# Patient Record
Sex: FEMALE | Race: WHITE | NOT HISPANIC OR LATINO | URBAN - METROPOLITAN AREA
[De-identification: names, ages, dates, MRNs, and addresses within clinical notes are randomized per-mention and may not be internally consistent; named-entity substitution may affect disease eponyms.]

---

## 2017-06-24 ENCOUNTER — EMERGENCY (EMERGENCY)
Facility: HOSPITAL | Age: 22
LOS: 1 days | Discharge: HOME | End: 2017-06-24
Attending: EMERGENCY MEDICINE
Payer: MEDICAID

## 2017-06-24 VITALS
HEART RATE: 106 BPM | RESPIRATION RATE: 18 BRPM | DIASTOLIC BLOOD PRESSURE: 87 MMHG | TEMPERATURE: 98 F | OXYGEN SATURATION: 100 % | WEIGHT: 139.99 LBS | HEIGHT: 68 IN | SYSTOLIC BLOOD PRESSURE: 119 MMHG
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
## 2017-06-24 DIAGNOSIS — M54.2 CERVICALGIA: ICD-10-CM

## 2017-06-24 DIAGNOSIS — M54.9 DORSALGIA, UNSPECIFIED: ICD-10-CM

## 2017-06-24 PROCEDURE — 99284 EMERGENCY DEPT VISIT MOD MDM: CPT

## 2017-06-24 PROCEDURE — 99283 EMERGENCY DEPT VISIT LOW MDM: CPT

## 2017-06-24 RX ORDER — IBUPROFEN 200 MG
600 TABLET ORAL ONCE
Qty: 0 | Refills: 0 | Status: COMPLETED | OUTPATIENT
Start: 2017-06-24 | End: 2017-06-24

## 2017-06-24 RX ADMIN — Medication 600 MILLIGRAM(S): at 15:22

## 2017-06-24 NOTE — ED PROVIDER NOTE - PHYSICAL EXAMINATION
neck cleared by nexus criteria  paraspinal ttp cervical spine  ttp over left upper ext FROM no deformity, neuro vasc intact

## 2017-06-24 NOTE — ED PROVIDER NOTE - CHPI ED SYMPTOMS NEG
no sleeping issues/no loss of consciousness/no fussiness/no difficulty bearing weight/no laceration/no dizziness/no disorientation/no headache/no bruising/no decreased eating/drinking

## 2019-01-29 ENCOUNTER — EMERGENCY (EMERGENCY)
Facility: HOSPITAL | Age: 24
LOS: 1 days | Discharge: HOME | End: 2019-01-29
Attending: EMERGENCY MEDICINE
Payer: COMMERCIAL

## 2019-01-29 VITALS
DIASTOLIC BLOOD PRESSURE: 68 MMHG | HEIGHT: 67 IN | TEMPERATURE: 98 F | HEART RATE: 80 BPM | OXYGEN SATURATION: 98 % | SYSTOLIC BLOOD PRESSURE: 103 MMHG | RESPIRATION RATE: 16 BRPM | WEIGHT: 154.98 LBS

## 2019-01-29 PROCEDURE — 99283 EMERGENCY DEPT VISIT LOW MDM: CPT | Mod: 25

## 2019-01-30 LAB
FLU A RESULT: SIGNIFICANT CHANGE UP
FLU A RESULT: SIGNIFICANT CHANGE UP
FLUAV AG NPH QL: SIGNIFICANT CHANGE UP
FLUBV AG NPH QL: SIGNIFICANT CHANGE UP
RSV RESULT: SIGNIFICANT CHANGE UP
RSV RNA RESP QL NAA+PROBE: SIGNIFICANT CHANGE UP

## 2019-01-30 PROCEDURE — 87631 RESP VIRUS 3-5 TARGETS: CPT

## 2019-01-30 PROCEDURE — 99283 EMERGENCY DEPT VISIT LOW MDM: CPT

## 2019-01-30 PROCEDURE — 87081 CULTURE SCREEN ONLY: CPT

## 2019-01-30 RX ORDER — AMOXICILLIN 250 MG/5ML
1 SUSPENSION, RECONSTITUTED, ORAL (ML) ORAL
Qty: 20 | Refills: 0
Start: 2019-01-30 | End: 2019-02-08

## 2019-01-30 NOTE — ED PROVIDER NOTE - NSFOLLOWUPINSTRUCTIONS_ED_ALL_ED_FT
Take antibiotic as prescribed.   Continue tylenol 650mg every 6 hours as needed for pain.  Followup with PMD for reevaluation.

## 2019-01-30 NOTE — ED ADULT NURSE NOTE - NSIMPLEMENTINTERV_GEN_ALL_ED
Implemented All Universal Safety Interventions:  [place] to call system. Call bell, personal items and telephone within reach. Instruct patient to call for assistance. Room bathroom lighting operational. Non-slip footwear when patient is off stretcher. Physically safe environment: no spills, clutter or unnecessary equipment. Stretcher in lowest position, wheels locked, appropriate side rails in place.

## 2019-01-30 NOTE — ED ADULT NURSE REASSESSMENT NOTE - NS ED NURSE REASSESS COMMENT FT1
The patient is adamant about not giving blood despite explanations with risks and benefits.  Dr. Judd made aware.

## 2019-01-30 NOTE — ED PROVIDER NOTE - OBJECTIVE STATEMENT
24yo F presents 1 day of sore throat. reports she was seen by OB who sent her to ED for flu testing. Patient currently 15wks gestation.  denies fevers, minimal cough-nonproductive.

## 2019-01-30 NOTE — ED PROVIDER NOTE - MEDICAL DECISION MAKING DETAILS
24yo F presents 1 day of sore throat. Exam shows pharyngitis. Flu swab negative. throat culture sent. Will treat empirically with amox.  Patient stable for discharge.

## 2019-01-30 NOTE — ED PROVIDER NOTE - ENMT, MLM
Airway patent, Nasal mucosa clear. Mouth with normal mucosa. Throat has mild erythema, no vesicles, no oropharyngeal exudates and uvula is midline. No cervical lymphadenopathy.

## 2019-02-01 LAB
CULTURE RESULTS: SIGNIFICANT CHANGE UP
SPECIMEN SOURCE: SIGNIFICANT CHANGE UP

## 2019-04-03 ENCOUNTER — OUTPATIENT (OUTPATIENT)
Dept: INPATIENT UNIT | Facility: HOSPITAL | Age: 24
LOS: 1 days | Discharge: HOME | End: 2019-04-03

## 2019-04-03 VITALS
HEART RATE: 89 BPM | RESPIRATION RATE: 16 BRPM | DIASTOLIC BLOOD PRESSURE: 63 MMHG | TEMPERATURE: 98 F | OXYGEN SATURATION: 100 % | SYSTOLIC BLOOD PRESSURE: 108 MMHG

## 2019-04-03 VITALS — SYSTOLIC BLOOD PRESSURE: 94 MMHG | DIASTOLIC BLOOD PRESSURE: 55 MMHG | HEART RATE: 77 BPM

## 2019-04-03 DIAGNOSIS — Z3A.00 WEEKS OF GESTATION OF PREGNANCY NOT SPECIFIED: ICD-10-CM

## 2019-04-03 DIAGNOSIS — O26.899 OTHER SPECIFIED PREGNANCY RELATED CONDITIONS, UNSPECIFIED TRIMESTER: ICD-10-CM

## 2019-04-03 LAB
BASOPHILS # BLD AUTO: 0.02 K/UL — SIGNIFICANT CHANGE UP (ref 0–0.2)
BASOPHILS NFR BLD AUTO: 0.2 % — SIGNIFICANT CHANGE UP (ref 0–2)
BLD GP AB SCN SERPL QL: NEGATIVE — SIGNIFICANT CHANGE UP
EOSINOPHIL # BLD AUTO: 0.09 K/UL — SIGNIFICANT CHANGE UP (ref 0–0.5)
EOSINOPHIL NFR BLD AUTO: 0.8 % — SIGNIFICANT CHANGE UP (ref 0–6)
FIBRINOGEN PPP-MCNC: 549 MG/DL — HIGH (ref 350–510)
HCT VFR BLD CALC: 30.8 % — LOW (ref 34.5–45)
HGB BLD-MCNC: 10 G/DL — LOW (ref 11.5–15.5)
IMM GRANULOCYTES NFR BLD AUTO: 0.9 % — SIGNIFICANT CHANGE UP (ref 0–1.5)
LYMPHOCYTES # BLD AUTO: 1.8 K/UL — SIGNIFICANT CHANGE UP (ref 1–3.3)
LYMPHOCYTES # BLD AUTO: 15.9 % — SIGNIFICANT CHANGE UP (ref 13–44)
MCHC RBC-ENTMCNC: 27.8 PG — SIGNIFICANT CHANGE UP (ref 27–34)
MCHC RBC-ENTMCNC: 32.5 % — SIGNIFICANT CHANGE UP (ref 32–36)
MCV RBC AUTO: 85.6 FL — SIGNIFICANT CHANGE UP (ref 80–100)
MONOCYTES # BLD AUTO: 0.8 K/UL — SIGNIFICANT CHANGE UP (ref 0–0.9)
MONOCYTES NFR BLD AUTO: 7.1 % — SIGNIFICANT CHANGE UP (ref 2–14)
NEUTROPHILS # BLD AUTO: 8.51 K/UL — HIGH (ref 1.8–7.4)
NEUTROPHILS NFR BLD AUTO: 75.1 % — SIGNIFICANT CHANGE UP (ref 43–77)
NRBC # FLD: 0 K/UL — SIGNIFICANT CHANGE UP (ref 0–0)
PLATELET # BLD AUTO: 222 K/UL — SIGNIFICANT CHANGE UP (ref 150–400)
PMV BLD: 10.2 FL — SIGNIFICANT CHANGE UP (ref 7–13)
RBC # BLD: 3.6 M/UL — LOW (ref 3.8–5.2)
RBC # FLD: 13.9 % — SIGNIFICANT CHANGE UP (ref 10.3–14.5)
RH IG SCN BLD-IMP: POSITIVE — SIGNIFICANT CHANGE UP
WBC # BLD: 11.32 K/UL — HIGH (ref 3.8–10.5)
WBC # FLD AUTO: 11.32 K/UL — HIGH (ref 3.8–10.5)

## 2019-04-03 NOTE — OB PROVIDER TRIAGE NOTE - NSHPPHYSICALEXAM_GEN_ALL_CORE
Assessment reveals VSS, abdomen soft, NT, gravid.   BPP 8/8, MVP 5.6, EFW 738g, vtx, fundal placenta appears intact.     Type and screen  Fibrinogen and CBC sent    For 4 hour monitoring.

## 2019-04-03 NOTE — OB PROVIDER TRIAGE NOTE - HISTORY OF PRESENT ILLNESS
22yo  @ 24.0 presents for evaluation after falling at 1430. Reports falling on her hands and knees and rolling onto her back. Denies abdominal trauma.   Denies LOF, VB, ctx and reports GFM.     Denies PMH/PSH    Ap course uncomplicated thus far

## 2019-04-03 NOTE — OB RN TRIAGE NOTE - CHIEF COMPLAINT QUOTE
I fell at 2:30pm but didn't hit my belly, landed on my hands and knees. My doctor said I should come get checked.

## 2019-04-23 ENCOUNTER — APPOINTMENT (OUTPATIENT)
Dept: RADIOLOGY | Facility: IMAGING CENTER | Age: 24
End: 2019-04-23
Payer: MEDICAID

## 2019-04-23 ENCOUNTER — OUTPATIENT (OUTPATIENT)
Dept: OUTPATIENT SERVICES | Facility: HOSPITAL | Age: 24
LOS: 1 days | End: 2019-04-23
Payer: COMMERCIAL

## 2019-04-23 DIAGNOSIS — Z34.02 ENCOUNTER FOR SUPERVISION OF NORMAL FIRST PREGNANCY, SECOND TRIMESTER: ICD-10-CM

## 2019-04-23 PROCEDURE — 71045 X-RAY EXAM CHEST 1 VIEW: CPT | Mod: 26

## 2019-04-23 PROCEDURE — 71045 X-RAY EXAM CHEST 1 VIEW: CPT

## 2019-06-03 ENCOUNTER — OUTPATIENT (OUTPATIENT)
Dept: OUTPATIENT SERVICES | Age: 24
LOS: 1 days | Discharge: HOME | End: 2019-06-03

## 2019-06-03 ENCOUNTER — APPOINTMENT (OUTPATIENT)
Dept: PEDIATRIC CARDIOLOGY | Facility: CLINIC | Age: 24
End: 2019-06-03
Payer: MEDICAID

## 2019-06-03 PROCEDURE — 76825 ECHO EXAM OF FETAL HEART: CPT

## 2019-06-03 PROCEDURE — 76827 ECHO EXAM OF FETAL HEART: CPT

## 2019-06-03 PROCEDURE — 99201 OFFICE OUTPATIENT NEW 10 MINUTES: CPT | Mod: 25

## 2019-06-03 PROCEDURE — 93325 DOPPLER ECHO COLOR FLOW MAPG: CPT

## 2019-07-03 ENCOUNTER — OUTPATIENT (OUTPATIENT)
Dept: OUTPATIENT SERVICES | Facility: HOSPITAL | Age: 24
LOS: 1 days | End: 2019-07-03

## 2019-07-03 ENCOUNTER — APPOINTMENT (OUTPATIENT)
Dept: ANTEPARTUM | Facility: HOSPITAL | Age: 24
End: 2019-07-03
Payer: MEDICAID

## 2019-07-03 ENCOUNTER — ASOB RESULT (OUTPATIENT)
Age: 24
End: 2019-07-03

## 2019-07-03 ENCOUNTER — APPOINTMENT (OUTPATIENT)
Dept: ANTEPARTUM | Facility: CLINIC | Age: 24
End: 2019-07-03
Payer: MEDICAID

## 2019-07-03 PROCEDURE — 76818 FETAL BIOPHYS PROFILE W/NST: CPT | Mod: 26

## 2019-07-03 PROCEDURE — 59025 FETAL NON-STRESS TEST: CPT | Mod: 26,59

## 2019-07-03 PROCEDURE — 76811 OB US DETAILED SNGL FETUS: CPT

## 2019-07-03 PROCEDURE — 59412 ANTEPARTUM MANIPULATION: CPT

## 2019-07-04 ENCOUNTER — OUTPATIENT (OUTPATIENT)
Dept: INPATIENT UNIT | Facility: HOSPITAL | Age: 24
LOS: 1 days | Discharge: HOME | End: 2019-07-04
Payer: MEDICAID

## 2019-07-04 VITALS — TEMPERATURE: 98 F

## 2019-07-04 VITALS
DIASTOLIC BLOOD PRESSURE: 63 MMHG | SYSTOLIC BLOOD PRESSURE: 105 MMHG | TEMPERATURE: 98 F | HEART RATE: 84 BPM | RESPIRATION RATE: 16 BRPM

## 2019-07-04 DIAGNOSIS — O26.899 OTHER SPECIFIED PREGNANCY RELATED CONDITIONS, UNSPECIFIED TRIMESTER: ICD-10-CM

## 2019-07-04 DIAGNOSIS — Z3A.00 WEEKS OF GESTATION OF PREGNANCY NOT SPECIFIED: ICD-10-CM

## 2019-07-04 PROCEDURE — 99212 OFFICE O/P EST SF 10 MIN: CPT | Mod: 25

## 2019-07-04 PROCEDURE — 59025 FETAL NON-STRESS TEST: CPT | Mod: 26

## 2019-07-04 PROCEDURE — 76815 OB US LIMITED FETUS(S): CPT | Mod: 26

## 2019-07-04 NOTE — OB PROVIDER TRIAGE NOTE - NSHPPHYSICALEXAM_GEN_ALL_CORE
Assessment reveals VSS, abdomen soft, NT, gravid.   BPP 8/8, MEAGHAN 10.6, vertex  Cat 1 FHT, Assessment reveals VSS, abdomen soft, NT, gravid.   BPP 8/8, MEAGHAN 10.6, vertex  Cat 1 FHT, irritability on toco

## 2019-07-04 NOTE — OB PROVIDER TRIAGE NOTE - HISTORY OF PRESENT ILLNESS
23yp  @ 37.1 presents for evaluation after successful ECV yesterday. Denies LOF, VB, ctx and reports GFM.     Denies PMH/PSH    AP course complicated by breech presentation 23yp  @ 37.1 presents for evaluation after successful ECV yesterday. Denies LOF, VB, ctx and reports GFM.     Denies PMH/PSH    AP course complicated by breech presentation  Fetal Alert: Frequent premature atrial contractions and blocked premature atrial contractions.  Frequent blocked APC's leading to variable fetal heart rates.

## 2019-07-09 ENCOUNTER — OUTPATIENT (OUTPATIENT)
Dept: INPATIENT UNIT | Facility: HOSPITAL | Age: 24
LOS: 1 days | Discharge: HOME | End: 2019-07-09
Payer: MEDICAID

## 2019-07-09 VITALS
TEMPERATURE: 99 F | HEART RATE: 80 BPM | SYSTOLIC BLOOD PRESSURE: 99 MMHG | DIASTOLIC BLOOD PRESSURE: 55 MMHG | RESPIRATION RATE: 16 BRPM

## 2019-07-09 VITALS — DIASTOLIC BLOOD PRESSURE: 54 MMHG | HEART RATE: 68 BPM | SYSTOLIC BLOOD PRESSURE: 97 MMHG

## 2019-07-09 DIAGNOSIS — O26.899 OTHER SPECIFIED PREGNANCY RELATED CONDITIONS, UNSPECIFIED TRIMESTER: ICD-10-CM

## 2019-07-09 DIAGNOSIS — Z3A.00 WEEKS OF GESTATION OF PREGNANCY NOT SPECIFIED: ICD-10-CM

## 2019-07-09 PROCEDURE — 59025 FETAL NON-STRESS TEST: CPT | Mod: 26

## 2019-07-09 NOTE — OB PROVIDER TRIAGE NOTE - NSOBPROVIDERNOTE_OBGYN_ALL_OB_FT
23 yr ol d @ 37.6 wks, r/o ROM  No evidence of ROM  NO evidence of labor  VE: L/C/P 23 yr ol d @ 37.6 wks, r/o ROM  No evidence of ROM  NO evidence of labor  VE: L/C/P    @ 9:19pm  CAT 1 tracing   TOCOL ctx none  Sono: vertex, MEAGHAN 10, BPP ,   Discussed with Dr. hernandez

## 2019-07-09 NOTE — OB PROVIDER TRIAGE NOTE - NSHPPHYSICALEXAM_GEN_ALL_CORE
VS: 97/54  TOCO: ctx irritability  NST: in progress  SSE: large white discharge consistent with Yeast infection, no pooling, nitrazine negative, ferning negative  VE: L/C/P

## 2019-07-09 NOTE — OB PROVIDER TRIAGE NOTE - HISTORY OF PRESENT ILLNESS
23 yr ol d @ 37.6 wks, presents with LOF @ 8 am, Denies VB/Ctx. Reports positive fetal movement. GBS results pending  PNI: s/p successful ECV on 7/3/19

## 2019-07-12 DIAGNOSIS — Z3A.37 37 WEEKS GESTATION OF PREGNANCY: ICD-10-CM

## 2019-07-17 ENCOUNTER — INPATIENT (INPATIENT)
Facility: HOSPITAL | Age: 24
LOS: 1 days | Discharge: HOME | End: 2019-07-19
Attending: OBSTETRICS & GYNECOLOGY | Admitting: OBSTETRICS & GYNECOLOGY

## 2019-07-17 VITALS — TEMPERATURE: 98 F | RESPIRATION RATE: 18 BRPM

## 2019-07-17 DIAGNOSIS — O26.899 OTHER SPECIFIED PREGNANCY RELATED CONDITIONS, UNSPECIFIED TRIMESTER: ICD-10-CM

## 2019-07-17 DIAGNOSIS — Z3A.00 WEEKS OF GESTATION OF PREGNANCY NOT SPECIFIED: ICD-10-CM

## 2019-07-17 PROBLEM — D64.9 ANEMIA, UNSPECIFIED: Chronic | Status: ACTIVE | Noted: 2019-07-09

## 2019-07-17 LAB
BASOPHILS # BLD AUTO: 0.03 K/UL — SIGNIFICANT CHANGE UP (ref 0–0.2)
BASOPHILS NFR BLD AUTO: 0.3 % — SIGNIFICANT CHANGE UP (ref 0–2)
BLD GP AB SCN SERPL QL: NEGATIVE — SIGNIFICANT CHANGE UP
EOSINOPHIL # BLD AUTO: 0.09 K/UL — SIGNIFICANT CHANGE UP (ref 0–0.5)
EOSINOPHIL NFR BLD AUTO: 1 % — SIGNIFICANT CHANGE UP (ref 0–6)
HCT VFR BLD CALC: 33.3 % — LOW (ref 34.5–45)
HGB BLD-MCNC: 10.5 G/DL — LOW (ref 11.5–15.5)
IMM GRANULOCYTES NFR BLD AUTO: 0.9 % — SIGNIFICANT CHANGE UP (ref 0–1.5)
LYMPHOCYTES # BLD AUTO: 1.52 K/UL — SIGNIFICANT CHANGE UP (ref 1–3.3)
LYMPHOCYTES # BLD AUTO: 16.7 % — SIGNIFICANT CHANGE UP (ref 13–44)
MCHC RBC-ENTMCNC: 26 PG — LOW (ref 27–34)
MCHC RBC-ENTMCNC: 31.5 % — LOW (ref 32–36)
MCV RBC AUTO: 82.4 FL — SIGNIFICANT CHANGE UP (ref 80–100)
MONOCYTES # BLD AUTO: 0.86 K/UL — SIGNIFICANT CHANGE UP (ref 0–0.9)
MONOCYTES NFR BLD AUTO: 9.5 % — SIGNIFICANT CHANGE UP (ref 2–14)
NEUTROPHILS # BLD AUTO: 6.5 K/UL — SIGNIFICANT CHANGE UP (ref 1.8–7.4)
NEUTROPHILS NFR BLD AUTO: 71.6 % — SIGNIFICANT CHANGE UP (ref 43–77)
NRBC # FLD: 0.02 K/UL — SIGNIFICANT CHANGE UP (ref 0–0)
PLATELET # BLD AUTO: 191 K/UL — SIGNIFICANT CHANGE UP (ref 150–400)
PMV BLD: 11 FL — SIGNIFICANT CHANGE UP (ref 7–13)
RBC # BLD: 4.04 M/UL — SIGNIFICANT CHANGE UP (ref 3.8–5.2)
RBC # FLD: 17.1 % — HIGH (ref 10.3–14.5)
RH IG SCN BLD-IMP: POSITIVE — SIGNIFICANT CHANGE UP
WBC # BLD: 9.08 K/UL — SIGNIFICANT CHANGE UP (ref 3.8–10.5)
WBC # FLD AUTO: 9.08 K/UL — SIGNIFICANT CHANGE UP (ref 3.8–10.5)

## 2019-07-17 RX ORDER — SODIUM CHLORIDE 9 MG/ML
1000 INJECTION, SOLUTION INTRAVENOUS
Refills: 0 | Status: DISCONTINUED | OUTPATIENT
Start: 2019-07-17 | End: 2019-07-17

## 2019-07-17 RX ORDER — LANOLIN
1 OINTMENT (GRAM) TOPICAL EVERY 6 HOURS
Refills: 0 | Status: DISCONTINUED | OUTPATIENT
Start: 2019-07-17 | End: 2019-07-19

## 2019-07-17 RX ORDER — TETANUS TOXOID, REDUCED DIPHTHERIA TOXOID AND ACELLULAR PERTUSSIS VACCINE, ADSORBED 5; 2.5; 8; 8; 2.5 [IU]/.5ML; [IU]/.5ML; UG/.5ML; UG/.5ML; UG/.5ML
0.5 SUSPENSION INTRAMUSCULAR ONCE
Refills: 0 | Status: DISCONTINUED | OUTPATIENT
Start: 2019-07-17 | End: 2019-07-19

## 2019-07-17 RX ORDER — BENZOCAINE 10 %
1 GEL (GRAM) MUCOUS MEMBRANE EVERY 6 HOURS
Refills: 0 | Status: DISCONTINUED | OUTPATIENT
Start: 2019-07-17 | End: 2019-07-19

## 2019-07-17 RX ORDER — AER TRAVELER 0.5 G/1
1 SOLUTION RECTAL; TOPICAL EVERY 4 HOURS
Refills: 0 | Status: DISCONTINUED | OUTPATIENT
Start: 2019-07-17 | End: 2019-07-19

## 2019-07-17 RX ORDER — OXYTOCIN 10 UNIT/ML
333.33 VIAL (ML) INJECTION
Qty: 20 | Refills: 0 | Status: COMPLETED | OUTPATIENT
Start: 2019-07-17 | End: 2019-07-17

## 2019-07-17 RX ORDER — KETOROLAC TROMETHAMINE 30 MG/ML
30 SYRINGE (ML) INJECTION ONCE
Refills: 0 | Status: DISCONTINUED | OUTPATIENT
Start: 2019-07-17 | End: 2019-07-17

## 2019-07-17 RX ORDER — DIBUCAINE 1 %
1 OINTMENT (GRAM) RECTAL EVERY 6 HOURS
Refills: 0 | Status: DISCONTINUED | OUTPATIENT
Start: 2019-07-17 | End: 2019-07-19

## 2019-07-17 RX ORDER — IBUPROFEN 200 MG
600 TABLET ORAL EVERY 6 HOURS
Refills: 0 | Status: DISCONTINUED | OUTPATIENT
Start: 2019-07-17 | End: 2019-07-19

## 2019-07-17 RX ORDER — HYDROCORTISONE 1 %
1 OINTMENT (GRAM) TOPICAL EVERY 6 HOURS
Refills: 0 | Status: DISCONTINUED | OUTPATIENT
Start: 2019-07-17 | End: 2019-07-19

## 2019-07-17 RX ORDER — OXYCODONE HYDROCHLORIDE 5 MG/1
5 TABLET ORAL
Refills: 0 | Status: DISCONTINUED | OUTPATIENT
Start: 2019-07-17 | End: 2019-07-19

## 2019-07-17 RX ORDER — OXYTOCIN 10 UNIT/ML
333.33 VIAL (ML) INJECTION
Qty: 20 | Refills: 0 | Status: DISCONTINUED | OUTPATIENT
Start: 2019-07-17 | End: 2019-07-18

## 2019-07-17 RX ORDER — GLYCERIN ADULT
1 SUPPOSITORY, RECTAL RECTAL AT BEDTIME
Refills: 0 | Status: DISCONTINUED | OUTPATIENT
Start: 2019-07-17 | End: 2019-07-19

## 2019-07-17 RX ORDER — OXYTOCIN 10 UNIT/ML
2 VIAL (ML) INJECTION
Qty: 30 | Refills: 0 | Status: DISCONTINUED | OUTPATIENT
Start: 2019-07-17 | End: 2019-07-17

## 2019-07-17 RX ORDER — OXYCODONE HYDROCHLORIDE 5 MG/1
5 TABLET ORAL ONCE
Refills: 0 | Status: DISCONTINUED | OUTPATIENT
Start: 2019-07-17 | End: 2019-07-19

## 2019-07-17 RX ORDER — SIMETHICONE 80 MG/1
80 TABLET, CHEWABLE ORAL EVERY 4 HOURS
Refills: 0 | Status: DISCONTINUED | OUTPATIENT
Start: 2019-07-17 | End: 2019-07-19

## 2019-07-17 RX ORDER — DIPHENHYDRAMINE HCL 50 MG
25 CAPSULE ORAL EVERY 6 HOURS
Refills: 0 | Status: DISCONTINUED | OUTPATIENT
Start: 2019-07-17 | End: 2019-07-19

## 2019-07-17 RX ORDER — MAGNESIUM HYDROXIDE 400 MG/1
30 TABLET, CHEWABLE ORAL
Refills: 0 | Status: DISCONTINUED | OUTPATIENT
Start: 2019-07-17 | End: 2019-07-19

## 2019-07-17 RX ORDER — DOCUSATE SODIUM 100 MG
100 CAPSULE ORAL
Refills: 0 | Status: DISCONTINUED | OUTPATIENT
Start: 2019-07-17 | End: 2019-07-19

## 2019-07-17 RX ORDER — PRAMOXINE HYDROCHLORIDE 150 MG/15G
1 AEROSOL, FOAM RECTAL EVERY 4 HOURS
Refills: 0 | Status: DISCONTINUED | OUTPATIENT
Start: 2019-07-17 | End: 2019-07-19

## 2019-07-17 RX ORDER — IBUPROFEN 200 MG
600 TABLET ORAL EVERY 6 HOURS
Refills: 0 | Status: COMPLETED | OUTPATIENT
Start: 2019-07-17 | End: 2020-06-14

## 2019-07-17 RX ORDER — ACETAMINOPHEN 500 MG
975 TABLET ORAL
Refills: 0 | Status: DISCONTINUED | OUTPATIENT
Start: 2019-07-17 | End: 2019-07-19

## 2019-07-17 RX ORDER — SODIUM CHLORIDE 9 MG/ML
3 INJECTION INTRAMUSCULAR; INTRAVENOUS; SUBCUTANEOUS EVERY 8 HOURS
Refills: 0 | Status: DISCONTINUED | OUTPATIENT
Start: 2019-07-17 | End: 2019-07-19

## 2019-07-17 RX ADMIN — Medication 1000 MILLIUNIT(S)/MIN: at 11:05

## 2019-07-17 RX ADMIN — SODIUM CHLORIDE 3 MILLILITER(S): 9 INJECTION INTRAMUSCULAR; INTRAVENOUS; SUBCUTANEOUS at 22:59

## 2019-07-17 RX ADMIN — Medication 975 MILLIGRAM(S): at 23:50

## 2019-07-17 RX ADMIN — SODIUM CHLORIDE 125 MILLILITER(S): 9 INJECTION, SOLUTION INTRAVENOUS at 04:56

## 2019-07-17 RX ADMIN — Medication 30 MILLIGRAM(S): at 11:50

## 2019-07-17 RX ADMIN — Medication 2 MILLIUNIT(S)/MIN: at 04:56

## 2019-07-17 RX ADMIN — Medication 600 MILLIGRAM(S): at 18:15

## 2019-07-17 RX ADMIN — Medication 1 TABLET(S): at 18:17

## 2019-07-17 RX ADMIN — Medication 600 MILLIGRAM(S): at 18:45

## 2019-07-17 RX ADMIN — Medication 975 MILLIGRAM(S): at 22:57

## 2019-07-17 NOTE — OB PROVIDER TRIAGE NOTE - NS_OBGYNHISTORY_OBGYN_ALL_OB_FT
GYN: Denies  OB: Denies      AP course uncomplicated  s/p Version on 7/3  GBS negative  Anemia in pregnancy on IRON

## 2019-07-17 NOTE — OB PROVIDER TRIAGE NOTE - NSHPPHYSICALEXAM_GEN_ALL_CORE
Assessment reveals VSS  Abdomen soft, NT, gravid   VE:4.5/70/-3  SSE: positive pooling, positive ferning, positive nitrazine

## 2019-07-17 NOTE — OB PROVIDER TRIAGE NOTE - NS_FETALANOMAL_OBGYN_ALL_OB
- Pain control  - Wound vac in place, suction on 125 continuous at all times  - abx     Dispo: to OR today for repeat I&D   Yes

## 2019-07-17 NOTE — OB PROVIDER TRIAGE NOTE - HISTORY OF PRESENT ILLNESS
22y/o G 24y/o  @39wks presents with leaking of fluid since 2am, clear.   Reports good fetal movement  Denies VB.     FETAL ALERT: Frequent premature atrial ctx, sees peds cards    Allergies:peaches-swelling  Medciations: PNV, iron  Denies Medical and Surgical HX  Denies Psy/Drugs/Etoh/Smoke

## 2019-07-17 NOTE — OB PROVIDER DELIVERY SUMMARY - NSPROVIDERDELIVERYNOTE_OBGYN_ALL_OB_FT
of a viable male infant from OA presentation. Head delivered easily, nuchal cord x 1 noted. Shoulders & body delivered. Infant handed to mother. Cord clamped x 2 & cut. Placenta delivered intact via gentle uterine massage. RML episiotomy repaired in the usual fashion. Excellent hemostasis noted. Fundus firm on bimanual massage.

## 2019-07-17 NOTE — OB PROVIDER H&P - NS_OBGYNHISTORY_OBGYN_ALL_OB_FT
GYN: Denies  OB: Denies      AP course uncomplicated  s/p Version on 7/3  GBS negative  Anemia in pregnancy on IRON  S/P fall x2 during pregnancy

## 2019-07-17 NOTE — OB PROVIDER H&P - HISTORY OF PRESENT ILLNESS
22y/o  @39wks presents with leaking of fluid since 2am, clear.   Reports good fetal movement  Denies VB.     FETAL ALERT: Frequent premature atrial ctx, sees peds cards    Allergies:peaches-swelling  Medciations: PNV, iron  Denies Medical and Surgical HX  Denies Psy/Drugs/Etoh/Smoke

## 2019-07-18 RX ORDER — ACETAMINOPHEN 500 MG
3 TABLET ORAL
Qty: 0 | Refills: 0 | DISCHARGE
Start: 2019-07-18

## 2019-07-18 RX ORDER — IBUPROFEN 200 MG
1 TABLET ORAL
Qty: 0 | Refills: 0 | DISCHARGE
Start: 2019-07-18

## 2019-07-18 RX ORDER — FERROUS SULFATE 325(65) MG
0 TABLET ORAL
Qty: 0 | Refills: 0 | DISCHARGE

## 2019-07-18 RX ADMIN — Medication 600 MILLIGRAM(S): at 07:48

## 2019-07-18 RX ADMIN — Medication 1 TABLET(S): at 14:03

## 2019-07-18 RX ADMIN — SODIUM CHLORIDE 3 MILLILITER(S): 9 INJECTION INTRAMUSCULAR; INTRAVENOUS; SUBCUTANEOUS at 06:29

## 2019-07-18 RX ADMIN — Medication 600 MILLIGRAM(S): at 14:03

## 2019-07-18 RX ADMIN — Medication 600 MILLIGRAM(S): at 14:30

## 2019-07-18 RX ADMIN — Medication 975 MILLIGRAM(S): at 06:25

## 2019-07-18 RX ADMIN — Medication 975 MILLIGRAM(S): at 05:26

## 2019-07-18 RX ADMIN — Medication 600 MILLIGRAM(S): at 08:46

## 2019-07-18 NOTE — PROGRESS NOTE ADULT - SUBJECTIVE AND OBJECTIVE BOX
Anesthesia Post-op Note    POD#1 S/P vaginal delivery    Patient is doing well.  OOBAA. Tolerating clears.  Pain is tolerable.  No residual anesthetic issues or complications noted.    Nikky Barron CRNA

## 2019-07-19 ENCOUNTER — TRANSCRIPTION ENCOUNTER (OUTPATIENT)
Age: 24
End: 2019-07-19

## 2019-07-19 VITALS
HEART RATE: 67 BPM | TEMPERATURE: 98 F | SYSTOLIC BLOOD PRESSURE: 102 MMHG | DIASTOLIC BLOOD PRESSURE: 61 MMHG | RESPIRATION RATE: 16 BRPM | OXYGEN SATURATION: 100 %

## 2019-07-19 LAB — T PALLIDUM AB TITR SER: NEGATIVE — SIGNIFICANT CHANGE UP

## 2019-07-19 RX ADMIN — Medication 600 MILLIGRAM(S): at 06:27

## 2019-07-19 RX ADMIN — Medication 600 MILLIGRAM(S): at 05:27

## 2019-07-19 NOTE — DISCHARGE NOTE OB - MEDICATION SUMMARY - MEDICATIONS TO TAKE
I will START or STAY ON the medications listed below when I get home from the hospital:    ibuprofen 600 mg oral tablet  -- 1 tab(s) by mouth every 6 hours, As Needed  -- Indication: For Labor and delivery, indication for care    acetaminophen 325 mg oral tablet  -- 3 tab(s) by mouth , As Needed  -- Indication: For Labor and delivery, indication for care

## 2019-07-19 NOTE — DISCHARGE NOTE OB - PATIENT PORTAL LINK FT
You can access the [facility] Patient Portal, offered by [facility], by registering with the following website: [email or web address]

## 2020-01-14 ENCOUNTER — EMERGENCY (EMERGENCY)
Facility: HOSPITAL | Age: 25
LOS: 1 days | Discharge: HOME | End: 2020-01-14
Attending: EMERGENCY MEDICINE
Payer: MEDICAID

## 2020-01-14 VITALS
HEIGHT: 67 IN | TEMPERATURE: 98 F | HEART RATE: 92 BPM | RESPIRATION RATE: 16 BRPM | DIASTOLIC BLOOD PRESSURE: 77 MMHG | WEIGHT: 145.06 LBS | OXYGEN SATURATION: 99 % | SYSTOLIC BLOOD PRESSURE: 117 MMHG

## 2020-01-14 PROCEDURE — 99283 EMERGENCY DEPT VISIT LOW MDM: CPT

## 2020-01-14 RX ORDER — IBUPROFEN 200 MG
600 TABLET ORAL ONCE
Refills: 0 | Status: DISCONTINUED | OUTPATIENT
Start: 2020-01-14 | End: 2020-01-14

## 2020-01-14 RX ORDER — KETOROLAC TROMETHAMINE 30 MG/ML
15 SYRINGE (ML) INJECTION ONCE
Refills: 0 | Status: DISCONTINUED | OUTPATIENT
Start: 2020-01-14 | End: 2020-01-14

## 2020-01-14 NOTE — ED ADULT TRIAGE NOTE - CHIEF COMPLAINT QUOTE
Missed step and fell about 2.5 feet (stairs),reports hitting head on the wall ,denied loc ,also c/o l knee pain too.

## 2020-01-14 NOTE — ED PROVIDER NOTE - OBJECTIVE STATEMENT
Pt is a 24y F with significant PMHx of anemia and no significant PSHx presenting to the ED s/p trip and fall. Pt reports she missed a step and fell down 2.5 feet and hit her head on a wall. Pt denies any loss of consciousness, nausea, confusion, or amnesia. Pt is not on blood thinners. Pt additionally complains of knee pain but notes no trouble bending her knee. Pt has NKDA and currently denies any additional complaints at this time.

## 2020-01-14 NOTE — ED PROVIDER NOTE - NS_EDPROVIDERDISPOUSERTYPE_ED_A_ED
Medical Social Work    Referral: Legal Hold    Intervention: Legal Hold Paperwork given to SW by Life Skills RN Maria Luisa    Legal Hold Initiated: Date: 8/24/18 Time: 0130    Patient’s Insurance Listed on Face Sheet: None    Referrals sent to: [facility]    This referral contains the following information:  1) Face sheet __x__  2) Page 1 and Page 2 of Legal Hold __x__  3) Alert Team Assessment/Psych Assessment __x__  4) Head to toe physical exam __x__  5) Urine Drug Screen __x__  6) Blood Alcohol __x__  7) Vital signs __x_  8) Pregnancy test when applicable __na_  9) Medications list __x__    Plan: Patient will transfer to mental health facility once acceptance is obtained     Attending Attestation (For Attendings USE Only)... Attending Attestation (For Attendings USE Only).../Scribe Attestation (For Scribes USE Only)...

## 2020-01-14 NOTE — ED PROVIDER NOTE - NS_ATTENDINGSCRIBE_ED_ALL_ED
Last OV:5/17/19 venous stasis CKD   Next OV:8/10/19     Requesting refill on below medications last filled.3/4/19 qty-60 2 refills     Per protocol I can fill     Done     
I personally performed the service described in the documentation recorded by the scribe in my presence, and it accurately and completely records my words and actions.

## 2020-01-14 NOTE — ED PROVIDER NOTE - PATIENT PORTAL LINK FT
You can access the FollowMyHealth Patient Portal offered by [facility] by registering at the following website: [email or web address]. By joining [facility]’s FollowMyHealth portal, you will also be able to view your health information using other applications (apps) compatible with our system.

## 2020-01-14 NOTE — ED ADULT NURSE NOTE - NSIMPLEMENTINTERV_GEN_ALL_ED
Implemented All Fall Risk Interventions:  [place] to call system. Call bell, personal items and telephone within reach. Instruct patient to call for assistance. Room bathroom lighting operational. Non-slip footwear when patient is off stretcher. Physically safe environment: no spills, clutter or unnecessary equipment. Stretcher in lowest position, wheels locked, appropriate side rails in place. Provide visual cue, wrist band, yellow gown, etc. Monitor gait and stability. Monitor for mental status changes and reorient to person, place, and time. Review medications for side effects contributing to fall risk. Reinforce activity limits and safety measures with patient and family.

## 2020-07-09 NOTE — ED PROVIDER NOTE - DISPOSITION TYPE
DISCHARGE Elliptical Excision Additional Text (Leave Blank If You Do Not Want): The margin was drawn around the clinically apparent lesion.  An elliptical shape was then drawn on the skin incorporating the lesion and margins.  Incisions were then made along these lines to the appropriate tissue plane and the lesion was extirpated.

## 2020-11-16 NOTE — OB PROVIDER TRIAGE NOTE - NS_CTXBYPALP_OBGYN_ALL_OB
HPI     DLS 4/17/2019 for esotripia  Presents today for ocular health check. Pt states she had notice [facility]   changes.  occ floaters, no flashes  OTC gtts as needed  CATsx 2005    Last edited by Shala Andre MA on 11/16/2020  2:51 PM. (History)        ROS     Positive for: Eyes (AET/ MFIOL OU)    Negative for: Constitutional, Gastrointestinal, Neurological, Skin,   Genitourinary, Musculoskeletal, HENT, Endocrine, Cardiovascular,   Respiratory, Psychiatric, Allergic/Imm, Heme/Lymph    Last edited by Almas Kidd, OD on 11/16/2020  2:56 PM. (History)        Assessment /Plan     For exam results, see Encounter Report.    Esotropia, alternating - Both Eyes    Intermediate stage nonexudative age-related macular degeneration of both eyes    Screening for glaucoma    Presbyopia    Choroidal nevus, left      1. Alt eso w prism in spex--pt happy w RX (dist only)  2. Mild pco OD sp multifocal iol OU/YAG OS--OD not ready for YAG yet, but getting close  3. Mild/mod ARMD (dry) OU (see OCT).  Discussed vitamins/sunglasses.  Re-Instructed on Amsler grid use  4. Choroidal nevus OS--not noted in previous exams.  Since large will get retinal consult    PLAN:    Retinal consult                 
None felt

## 2021-05-03 ENCOUNTER — NON-APPOINTMENT (OUTPATIENT)
Age: 26
End: 2021-05-03

## 2021-05-03 ENCOUNTER — APPOINTMENT (OUTPATIENT)
Dept: OBGYN | Facility: CLINIC | Age: 26
End: 2021-05-03
Payer: MEDICAID

## 2021-05-03 PROCEDURE — 99385 PREV VISIT NEW AGE 18-39: CPT

## 2021-05-03 PROCEDURE — 99072 ADDL SUPL MATRL&STAF TM PHE: CPT

## 2021-05-10 ENCOUNTER — APPOINTMENT (OUTPATIENT)
Dept: OBGYN | Facility: CLINIC | Age: 26
End: 2021-05-10
Payer: MEDICAID

## 2021-05-10 ENCOUNTER — APPOINTMENT (OUTPATIENT)
Dept: ANTEPARTUM | Facility: CLINIC | Age: 26
End: 2021-05-10
Payer: MEDICAID

## 2021-05-10 PROCEDURE — 76856 US EXAM PELVIC COMPLETE: CPT

## 2021-05-10 PROCEDURE — 76830 TRANSVAGINAL US NON-OB: CPT

## 2021-05-10 PROCEDURE — ZZZZZ: CPT

## 2021-08-16 NOTE — ED PROVIDER NOTE - CONDUCTED A DETAILED DISCUSSION WITH PATIENT AND/OR GUARDIAN REGARDING, MDM
16-Aug-2021 12:56 return to ED if symptoms worsen, persist or questions arise/lab results/need for outpatient follow-up

## 2022-04-27 ENCOUNTER — APPOINTMENT (OUTPATIENT)
Dept: OBGYN | Facility: CLINIC | Age: 27
End: 2022-04-27
Payer: MEDICAID

## 2022-04-27 VITALS
DIASTOLIC BLOOD PRESSURE: 69 MMHG | WEIGHT: 165 LBS | SYSTOLIC BLOOD PRESSURE: 107 MMHG | BODY MASS INDEX: 25.9 KG/M2 | HEIGHT: 67 IN

## 2022-04-27 DIAGNOSIS — Z83.3 FAMILY HISTORY OF DIABETES MELLITUS: ICD-10-CM

## 2022-04-27 DIAGNOSIS — Z31.69 ENCOUNTER FOR OTHER GENERAL COUNSELING AND ADVICE ON PROCREATION: ICD-10-CM

## 2022-04-27 DIAGNOSIS — O28.3 ABNORMAL ULTRASONIC FINDING ON ANTENATAL SCREENING OF MOTHER: ICD-10-CM

## 2022-04-27 DIAGNOSIS — Z82.49 FAMILY HISTORY OF ISCHEMIC HEART DISEASE AND OTHER DISEASES OF THE CIRCULATORY SYSTEM: ICD-10-CM

## 2022-04-27 DIAGNOSIS — Z83.42 FAMILY HISTORY OF FAMILIAL HYPERCHOLESTEROLEMIA: ICD-10-CM

## 2022-04-27 PROCEDURE — 99395 PREV VISIT EST AGE 18-39: CPT

## 2022-04-27 RX ORDER — CETIRIZINE HCL 10 MG
TABLET ORAL
Refills: 0 | Status: ACTIVE | COMMUNITY

## 2022-04-27 NOTE — HISTORY OF PRESENT ILLNESS
[Gonorrhea test offered] : Gonorrhea test offered [Chlamydia test offered] : Chlamydia test offered [HPV test offered] : HPV test offered [N] : Patient does not use contraception [Monogamous] : is monogamous [Y] : Positive pregnancy history [TextBox_102] : irregular and short cycles [LMPDate] : 04/20/22 [MensesFreq] : 21 [MensesLength] : 3 [MensesAmount] : normal [PGxTotal] : 1 [[facility] CenterxFulerm] : 1 [PGHxPremature] : 0 [PGHxAbortions] : 0 [[facility] Cliniciving] : 1 [FreeTextEntry1] :  x 1 [Currently Active] : currently active [Men] : men [Vaginal] : vaginal [No] : No [Patient refuses STI testing] : Patient refuses STI testing

## 2022-04-27 NOTE — PLAN
[FreeTextEntry1] : 26 year old P1 presenting for annual exam and  preconception counseling. Medical record reviewed and patient last had PAP smear on 5/3/21, advised that she can return to the office in a few months for PAP as it has not yet been a full year. Will defer any infertility work up for now as patient is young and healthy. Low concern for any reason at this time that she would be unable to conceive. It is also reassuring that patient has successfully had one child already in the past.\par -uterus is normal sized and mobile\par -f/u GC/CT done today\par -contraception: none, patient desires pregnancy\par -if by Nov/Dec 2022 she is not pregnant yet, advised her to follow up for further evaluation at that time\par -f/u PRN

## 2022-04-28 LAB
C TRACH RRNA SPEC QL NAA+PROBE: NOT DETECTED
N GONORRHOEA RRNA SPEC QL NAA+PROBE: NOT DETECTED
SOURCE AMPLIFICATION: NORMAL

## 2022-06-23 ENCOUNTER — APPOINTMENT (OUTPATIENT)
Dept: OBGYN | Facility: CLINIC | Age: 27
End: 2022-06-23

## 2022-06-23 VITALS
BODY MASS INDEX: 26.68 KG/M2 | HEIGHT: 67 IN | SYSTOLIC BLOOD PRESSURE: 114 MMHG | DIASTOLIC BLOOD PRESSURE: 77 MMHG | WEIGHT: 170 LBS

## 2022-06-23 PROCEDURE — 0502F SUBSEQUENT PRENATAL CARE: CPT

## 2022-06-23 NOTE — HISTORY OF PRESENT ILLNESS
[N] : Patient reports normal menses [Y] : Positive pregnancy history [Currently Active] : currently active [Men] : men [No] : No [FreeTextEntry1] : Patient is a 26 year old LMP around 2022  who presents after she had a positive home pregnancy test. She is endorsing occasional nausea, cramping, and breast tenderness. \par  [LMPDate] : 05/01/2022 [PGHxTotal] : 2 [[facility]] : 1 [PGHxPremature] : 0 [PGHxAbortions] : 0 [[facility] Neu[facility] Instituteiving] : 1 [PGHxABInduced] : 0 [PGHxABSpont] : 0 [PGHxEctopic] : 0 [PGHxMultBirths] : 0

## 2022-06-23 NOTE — PLAN
[FreeTextEntry1] : Unofficial sono today reveals sac but no FHR as it is too early. \par 2 weeks for first trimester ultrasound and review of prenatal lab results \par -f/u PAP and GC/CT done today\par -f/u prenatal blood work drawn today\par -Rx sent for PNV

## 2022-06-27 LAB
ABO + RH PNL BLD: NORMAL
APPEARANCE: CLEAR
BACTERIA UR CULT: NORMAL
BACTERIA: NEGATIVE
BASOPHILS # BLD AUTO: 0.03 K/UL
BASOPHILS NFR BLD AUTO: 0.4 %
BILIRUBIN URINE: NEGATIVE
BLD GP AB SCN SERPL QL: NORMAL
BLOOD URINE: NEGATIVE
C TRACH RRNA SPEC QL NAA+PROBE: NOT DETECTED
COLOR: NORMAL
EOSINOPHIL # BLD AUTO: 0.08 K/UL
EOSINOPHIL NFR BLD AUTO: 1.1 %
ESTIMATED AVERAGE GLUCOSE: 103 MG/DL
GLUCOSE QUALITATIVE U: NEGATIVE
GLUCOSE SERPL-MCNC: 81 MG/DL
HAV IGM SER QL: NONREACTIVE
HBA1C MFR BLD HPLC: 5.2 %
HBV CORE IGM SER QL: NONREACTIVE
HBV SURFACE AG SER QL: NONREACTIVE
HBV SURFACE AG SER QL: NONREACTIVE
HCT VFR BLD CALC: 42 %
HCV AB SER QL: NONREACTIVE
HCV S/CO RATIO: 0.19 S/CO
HGB A MFR BLD: 97.4 %
HGB A2 MFR BLD: 2.6 %
HGB BLD-MCNC: 13.2 G/DL
HGB FRACT BLD-IMP: NORMAL
HIV1+2 AB SPEC QL IA.RAPID: NONREACTIVE
HPV 16 E6+E7 MRNA CVX QL NAA+PROBE: NOT DETECTED
HPV HIGH+LOW RISK DNA PNL CVX: NOT DETECTED
HPV18+45 E6+E7 MRNA CVX QL NAA+PROBE: NOT DETECTED
HYALINE CASTS: 0 /LPF
IMM GRANULOCYTES NFR BLD AUTO: 0.1 %
KETONES URINE: NEGATIVE
LEAD BLD-MCNC: <1 UG/DL
LEUKOCYTE ESTERASE URINE: NEGATIVE
LYMPHOCYTES # BLD AUTO: 2.27 K/UL
LYMPHOCYTES NFR BLD AUTO: 31.1 %
MAN DIFF?: NORMAL
MCHC RBC-ENTMCNC: 27.6 PG
MCHC RBC-ENTMCNC: 31.4 GM/DL
MCV RBC AUTO: 87.9 FL
MEV IGG FLD QL IA: >300 AU/ML
MEV IGG+IGM SER-IMP: POSITIVE
MICROSCOPIC-UA: NORMAL
MONOCYTES # BLD AUTO: 0.45 K/UL
MONOCYTES NFR BLD AUTO: 6.2 %
MUV AB SER-ACNC: POSITIVE
MUV IGG SER QL IA: 237 AU/ML
N GONORRHOEA RRNA SPEC QL NAA+PROBE: NOT DETECTED
NEUTROPHILS # BLD AUTO: 4.45 K/UL
NEUTROPHILS NFR BLD AUTO: 61.1 %
NITRITE URINE: NEGATIVE
PH URINE: 6
PLATELET # BLD AUTO: 250 K/UL
PROTEIN URINE: NEGATIVE
RBC # BLD: 4.78 M/UL
RBC # FLD: 15.1 %
RED BLOOD CELLS URINE: 4 /HPF
RUBV IGG FLD-ACNC: 4 INDEX
RUBV IGG SER-IMP: POSITIVE
SOURCE AMPLIFICATION: NORMAL
SPECIFIC GRAVITY URINE: 1.01
SQUAMOUS EPITHELIAL CELLS: 0 /HPF
T PALLIDUM AB SER QL IA: NEGATIVE
UROBILINOGEN URINE: NORMAL
WBC # FLD AUTO: 7.29 K/UL
WHITE BLOOD CELLS URINE: 0 /HPF

## 2022-06-28 LAB — CYTOLOGY CVX/VAG DOC THIN PREP: NORMAL

## 2022-06-29 LAB — AR GENE MUT ANL BLD/T: NORMAL

## 2022-07-11 ENCOUNTER — APPOINTMENT (OUTPATIENT)
Dept: ANTEPARTUM | Facility: CLINIC | Age: 27
End: 2022-07-11

## 2022-07-11 ENCOUNTER — APPOINTMENT (OUTPATIENT)
Dept: OBGYN | Facility: CLINIC | Age: 27
End: 2022-07-11

## 2022-07-11 VITALS
DIASTOLIC BLOOD PRESSURE: 65 MMHG | SYSTOLIC BLOOD PRESSURE: 102 MMHG | HEIGHT: 67 IN | BODY MASS INDEX: 26.21 KG/M2 | WEIGHT: 167 LBS

## 2022-07-11 PROCEDURE — 0502F SUBSEQUENT PRENATAL CARE: CPT

## 2022-07-11 PROCEDURE — 76801 OB US < 14 WKS SINGLE FETUS: CPT

## 2022-07-12 LAB
CFTR MUT TESTED BLD/T: NEGATIVE
FMR1 GENE MUT ANL BLD/T: NORMAL

## 2022-07-18 ENCOUNTER — APPOINTMENT (OUTPATIENT)
Dept: OBGYN | Facility: CLINIC | Age: 27
End: 2022-07-18

## 2022-07-18 VITALS
DIASTOLIC BLOOD PRESSURE: 73 MMHG | HEIGHT: 67 IN | WEIGHT: 167 LBS | SYSTOLIC BLOOD PRESSURE: 108 MMHG | BODY MASS INDEX: 26.21 KG/M2

## 2022-07-18 PROCEDURE — 76815 OB US LIMITED FETUS(S): CPT

## 2022-07-18 PROCEDURE — 0502F SUBSEQUENT PRENATAL CARE: CPT

## 2022-07-21 ENCOUNTER — NON-APPOINTMENT (OUTPATIENT)
Age: 27
End: 2022-07-21

## 2022-08-08 ENCOUNTER — APPOINTMENT (OUTPATIENT)
Dept: ANTEPARTUM | Facility: CLINIC | Age: 27
End: 2022-08-08

## 2022-08-08 ENCOUNTER — APPOINTMENT (OUTPATIENT)
Dept: OBGYN | Facility: CLINIC | Age: 27
End: 2022-08-08

## 2022-08-08 VITALS
SYSTOLIC BLOOD PRESSURE: 114 MMHG | BODY MASS INDEX: 26.53 KG/M2 | DIASTOLIC BLOOD PRESSURE: 74 MMHG | WEIGHT: 169 LBS | HEIGHT: 67 IN

## 2022-08-08 DIAGNOSIS — Z3A.12 12 WEEKS GESTATION OF PREGNANCY: ICD-10-CM

## 2022-08-08 PROCEDURE — 36415 COLL VENOUS BLD VENIPUNCTURE: CPT

## 2022-08-08 PROCEDURE — 0502F SUBSEQUENT PRENATAL CARE: CPT

## 2022-08-08 PROCEDURE — 76813 OB US NUCHAL MEAS 1 GEST: CPT

## 2022-08-15 LAB
CLARI ADDITIONAL INFO: NORMAL
CLARI CHROMOSOME 13: NORMAL
CLARI CHROMOSOME 18: NORMAL
CLARI CHROMOSOME 21: NORMAL
CLARI SEX CHROMOSOMES: NORMAL
CLARI TEST COMMENT: NORMAL
CLARITEST NIPT: NORMAL
FETAL FRACT: NORMAL
GESTATION AGE: NORMAL
MATERNAL WEIGHT (LBS):: NORMAL
PLEASE INCLUDE GENDER RESULTS ON THIS REPORT:: NORMAL
TYPE OF PREGNANCY:: NORMAL
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
## 2022-09-07 ENCOUNTER — APPOINTMENT (OUTPATIENT)
Dept: OBGYN | Facility: CLINIC | Age: 27
End: 2022-09-07

## 2022-09-07 VITALS — BODY MASS INDEX: 27.1 KG/M2 | DIASTOLIC BLOOD PRESSURE: 61 MMHG | SYSTOLIC BLOOD PRESSURE: 97 MMHG | WEIGHT: 173 LBS

## 2022-09-07 DIAGNOSIS — R30.0 DYSURIA: ICD-10-CM

## 2022-09-07 DIAGNOSIS — Z3A.16 16 WEEKS GESTATION OF PREGNANCY: ICD-10-CM

## 2022-09-07 PROCEDURE — 0502F SUBSEQUENT PRENATAL CARE: CPT

## 2022-09-07 PROCEDURE — 36415 COLL VENOUS BLD VENIPUNCTURE: CPT

## 2022-09-07 RX ORDER — TERCONAZOLE 4 MG/G
0.4 CREAM VAGINAL
Qty: 1 | Refills: 0 | Status: ACTIVE | COMMUNITY
Start: 2022-09-07 | End: 1900-01-01

## 2022-09-08 ENCOUNTER — NON-APPOINTMENT (OUTPATIENT)
Age: 27
End: 2022-09-08

## 2022-09-09 LAB
APPEARANCE: CLEAR
BACTERIA: NEGATIVE
BILIRUBIN URINE: NEGATIVE
BLOOD URINE: NEGATIVE
COLOR: NORMAL
GLUCOSE QUALITATIVE U: NEGATIVE
HYALINE CASTS: 1 /LPF
KETONES URINE: ABNORMAL
LEUKOCYTE ESTERASE URINE: ABNORMAL
MICROSCOPIC-UA: NORMAL
NITRITE URINE: NEGATIVE
PH URINE: 6.5
PROTEIN URINE: NEGATIVE
RED BLOOD CELLS URINE: 2 /HPF
SPECIFIC GRAVITY URINE: 1.02
SQUAMOUS EPITHELIAL CELLS: 2 /HPF
UROBILINOGEN URINE: NORMAL
WHITE BLOOD CELLS URINE: 9 /HPF

## 2022-09-11 LAB — BACTERIA UR CULT: NORMAL

## 2022-09-13 LAB — AFP PNL SERPL: NORMAL

## 2022-10-10 ENCOUNTER — APPOINTMENT (OUTPATIENT)
Dept: OBGYN | Facility: CLINIC | Age: 27
End: 2022-10-10

## 2022-10-10 ENCOUNTER — APPOINTMENT (OUTPATIENT)
Dept: ANTEPARTUM | Facility: CLINIC | Age: 27
End: 2022-10-10

## 2022-10-10 VITALS
WEIGHT: 177 LBS | SYSTOLIC BLOOD PRESSURE: 112 MMHG | BODY MASS INDEX: 27.78 KG/M2 | DIASTOLIC BLOOD PRESSURE: 75 MMHG | HEIGHT: 67 IN

## 2022-10-10 PROCEDURE — 76805 OB US >/= 14 WKS SNGL FETUS: CPT

## 2022-10-10 PROCEDURE — 0502F SUBSEQUENT PRENATAL CARE: CPT

## 2022-11-07 ENCOUNTER — APPOINTMENT (OUTPATIENT)
Dept: OBGYN | Facility: CLINIC | Age: 27
End: 2022-11-07

## 2022-11-07 VITALS — BODY MASS INDEX: 27.88 KG/M2 | DIASTOLIC BLOOD PRESSURE: 65 MMHG | WEIGHT: 178 LBS | SYSTOLIC BLOOD PRESSURE: 97 MMHG

## 2022-11-07 PROCEDURE — 36415 COLL VENOUS BLD VENIPUNCTURE: CPT

## 2022-11-07 PROCEDURE — 0502F SUBSEQUENT PRENATAL CARE: CPT

## 2022-11-08 LAB
BASOPHILS # BLD AUTO: 0.03 K/UL
BASOPHILS NFR BLD AUTO: 0.3 %
EOSINOPHIL # BLD AUTO: 0.07 K/UL
EOSINOPHIL NFR BLD AUTO: 0.8 %
GLUCOSE 1H P 50 G GLC PO SERPL-MCNC: 83 MG/DL
HCT VFR BLD CALC: 35.7 %
HGB BLD-MCNC: 10.6 G/DL
IMM GRANULOCYTES NFR BLD AUTO: 1 %
LYMPHOCYTES # BLD AUTO: 2.08 K/UL
LYMPHOCYTES NFR BLD AUTO: 22.6 %
MAN DIFF?: NORMAL
MCHC RBC-ENTMCNC: 27.9 PG
MCHC RBC-ENTMCNC: 29.7 GM/DL
MCV RBC AUTO: 93.9 FL
MONOCYTES # BLD AUTO: 0.61 K/UL
MONOCYTES NFR BLD AUTO: 6.6 %
NEUTROPHILS # BLD AUTO: 6.33 K/UL
NEUTROPHILS NFR BLD AUTO: 68.7 %
PLATELET # BLD AUTO: 229 K/UL
RBC # BLD: 3.8 M/UL
RBC # FLD: 15.5 %
WBC # FLD AUTO: 9.21 K/UL

## 2022-11-10 ENCOUNTER — NON-APPOINTMENT (OUTPATIENT)
Age: 27
End: 2022-11-10

## 2022-11-28 ENCOUNTER — APPOINTMENT (OUTPATIENT)
Dept: OBGYN | Facility: CLINIC | Age: 27
End: 2022-11-28

## 2022-11-28 PROCEDURE — 0502F SUBSEQUENT PRENATAL CARE: CPT

## 2022-11-29 ENCOUNTER — NON-APPOINTMENT (OUTPATIENT)
Age: 27
End: 2022-11-29

## 2022-12-12 ENCOUNTER — APPOINTMENT (OUTPATIENT)
Dept: ANTEPARTUM | Facility: CLINIC | Age: 27
End: 2022-12-12

## 2022-12-12 ENCOUNTER — APPOINTMENT (OUTPATIENT)
Dept: OBGYN | Facility: CLINIC | Age: 27
End: 2022-12-12

## 2022-12-12 VITALS — SYSTOLIC BLOOD PRESSURE: 103 MMHG | BODY MASS INDEX: 28.82 KG/M2 | DIASTOLIC BLOOD PRESSURE: 66 MMHG | WEIGHT: 184 LBS

## 2022-12-12 PROCEDURE — 0502F SUBSEQUENT PRENATAL CARE: CPT

## 2022-12-12 PROCEDURE — 76819 FETAL BIOPHYS PROFIL W/O NST: CPT | Mod: 59

## 2022-12-12 PROCEDURE — 76816 OB US FOLLOW-UP PER FETUS: CPT

## 2022-12-28 ENCOUNTER — APPOINTMENT (OUTPATIENT)
Dept: OBGYN | Facility: CLINIC | Age: 27
End: 2022-12-28

## 2022-12-28 VITALS — WEIGHT: 184 LBS | BODY MASS INDEX: 28.82 KG/M2 | DIASTOLIC BLOOD PRESSURE: 70 MMHG | SYSTOLIC BLOOD PRESSURE: 106 MMHG

## 2022-12-28 PROCEDURE — 90715 TDAP VACCINE 7 YRS/> IM: CPT

## 2022-12-28 PROCEDURE — 90471 IMMUNIZATION ADMIN: CPT

## 2022-12-28 PROCEDURE — 0502F SUBSEQUENT PRENATAL CARE: CPT

## 2023-01-18 ENCOUNTER — APPOINTMENT (OUTPATIENT)
Dept: OBGYN | Facility: CLINIC | Age: 28
End: 2023-01-18
Payer: MEDICAID

## 2023-01-18 VITALS — WEIGHT: 164 LBS | BODY MASS INDEX: 25.69 KG/M2 | SYSTOLIC BLOOD PRESSURE: 129 MMHG | DIASTOLIC BLOOD PRESSURE: 83 MMHG

## 2023-01-18 DIAGNOSIS — Z33.1 PREGNANT STATE, INCIDENTAL: ICD-10-CM

## 2023-01-18 PROCEDURE — ZZZZZ: CPT

## 2023-01-19 LAB — HIV1+2 AB SPEC QL IA.RAPID: NONREACTIVE

## 2023-01-23 ENCOUNTER — APPOINTMENT (OUTPATIENT)
Dept: OBGYN | Facility: CLINIC | Age: 28
End: 2023-01-23
Payer: MEDICAID

## 2023-01-23 VITALS
DIASTOLIC BLOOD PRESSURE: 74 MMHG | WEIGHT: 190 LBS | SYSTOLIC BLOOD PRESSURE: 109 MMHG | HEIGHT: 67 IN | BODY MASS INDEX: 29.82 KG/M2

## 2023-01-23 LAB — B-HEM STREP SPEC QL CULT: NORMAL

## 2023-01-23 PROCEDURE — 0502F SUBSEQUENT PRENATAL CARE: CPT

## 2023-01-30 ENCOUNTER — APPOINTMENT (OUTPATIENT)
Dept: OBGYN | Facility: CLINIC | Age: 28
End: 2023-01-30
Payer: MEDICAID

## 2023-01-30 ENCOUNTER — NON-APPOINTMENT (OUTPATIENT)
Age: 28
End: 2023-01-30

## 2023-01-30 ENCOUNTER — APPOINTMENT (OUTPATIENT)
Dept: ANTEPARTUM | Facility: CLINIC | Age: 28
End: 2023-01-30
Payer: MEDICAID

## 2023-01-30 VITALS — DIASTOLIC BLOOD PRESSURE: 74 MMHG | BODY MASS INDEX: 29.6 KG/M2 | SYSTOLIC BLOOD PRESSURE: 116 MMHG | WEIGHT: 189 LBS

## 2023-01-30 PROCEDURE — 76816 OB US FOLLOW-UP PER FETUS: CPT

## 2023-01-30 PROCEDURE — 0502F SUBSEQUENT PRENATAL CARE: CPT

## 2023-01-30 PROCEDURE — 76819 FETAL BIOPHYS PROFIL W/O NST: CPT | Mod: 59

## 2023-01-31 ENCOUNTER — NON-APPOINTMENT (OUTPATIENT)
Age: 28
End: 2023-01-31

## 2023-02-04 ENCOUNTER — APPOINTMENT (OUTPATIENT)
Dept: ANTEPARTUM | Facility: CLINIC | Age: 28
End: 2023-02-04

## 2023-02-04 ENCOUNTER — TRANSCRIPTION ENCOUNTER (OUTPATIENT)
Age: 28
End: 2023-02-04

## 2023-02-04 ENCOUNTER — INPATIENT (INPATIENT)
Facility: HOSPITAL | Age: 28
LOS: 1 days | Discharge: HOME | End: 2023-02-06
Attending: OBSTETRICS & GYNECOLOGY | Admitting: OBSTETRICS & GYNECOLOGY
Payer: MEDICAID

## 2023-02-04 VITALS
HEART RATE: 106 BPM | RESPIRATION RATE: 14 BRPM | TEMPERATURE: 98 F | DIASTOLIC BLOOD PRESSURE: 63 MMHG | SYSTOLIC BLOOD PRESSURE: 108 MMHG

## 2023-02-04 DIAGNOSIS — O26.899 OTHER SPECIFIED PREGNANCY RELATED CONDITIONS, UNSPECIFIED TRIMESTER: ICD-10-CM

## 2023-02-04 LAB
AMNISURE ROM (RUPTURE OF MEMBRANES): POSITIVE
BASOPHILS # BLD AUTO: 0.03 K/UL — SIGNIFICANT CHANGE UP (ref 0–0.2)
BASOPHILS NFR BLD AUTO: 0.3 % — SIGNIFICANT CHANGE UP (ref 0–2)
BLD GP AB SCN SERPL QL: NEGATIVE — SIGNIFICANT CHANGE UP
EOSINOPHIL # BLD AUTO: 0.07 K/UL — SIGNIFICANT CHANGE UP (ref 0–0.5)
EOSINOPHIL NFR BLD AUTO: 0.8 % — SIGNIFICANT CHANGE UP (ref 0–6)
HCT VFR BLD CALC: 30.5 % — LOW (ref 34.5–45)
HGB BLD-MCNC: 9.3 G/DL — LOW (ref 11.5–15.5)
IANC: 5.94 K/UL — SIGNIFICANT CHANGE UP (ref 1.8–7.4)
IMM GRANULOCYTES NFR BLD AUTO: 2 % — HIGH (ref 0–0.9)
LYMPHOCYTES # BLD AUTO: 2.01 K/UL — SIGNIFICANT CHANGE UP (ref 1–3.3)
LYMPHOCYTES # BLD AUTO: 22.1 % — SIGNIFICANT CHANGE UP (ref 13–44)
MCHC RBC-ENTMCNC: 23.7 PG — LOW (ref 27–34)
MCHC RBC-ENTMCNC: 30.5 GM/DL — LOW (ref 32–36)
MCV RBC AUTO: 77.8 FL — LOW (ref 80–100)
MONOCYTES # BLD AUTO: 0.87 K/UL — SIGNIFICANT CHANGE UP (ref 0–0.9)
MONOCYTES NFR BLD AUTO: 9.6 % — SIGNIFICANT CHANGE UP (ref 2–14)
NEUTROPHILS # BLD AUTO: 5.94 K/UL — SIGNIFICANT CHANGE UP (ref 1.8–7.4)
NEUTROPHILS NFR BLD AUTO: 65.2 % — SIGNIFICANT CHANGE UP (ref 43–77)
NRBC # BLD: 0 /100 WBCS — SIGNIFICANT CHANGE UP (ref 0–0)
NRBC # FLD: 0 K/UL — SIGNIFICANT CHANGE UP (ref 0–0)
PLATELET # BLD AUTO: 221 K/UL — SIGNIFICANT CHANGE UP (ref 150–400)
RBC # BLD: 3.92 M/UL — SIGNIFICANT CHANGE UP (ref 3.8–5.2)
RBC # FLD: 16.6 % — HIGH (ref 10.3–14.5)
RH IG SCN BLD-IMP: POSITIVE — SIGNIFICANT CHANGE UP
SARS-COV-2 RNA SPEC QL NAA+PROBE: SIGNIFICANT CHANGE UP
WBC # BLD: 9.1 K/UL — SIGNIFICANT CHANGE UP (ref 3.8–10.5)
WBC # FLD AUTO: 9.1 K/UL — SIGNIFICANT CHANGE UP (ref 3.8–10.5)

## 2023-02-04 PROCEDURE — 59400 OBSTETRICAL CARE: CPT | Mod: U7

## 2023-02-04 RX ORDER — ACETAMINOPHEN 500 MG
975 TABLET ORAL
Refills: 0 | Status: DISCONTINUED | OUTPATIENT
Start: 2023-02-04 | End: 2023-02-06

## 2023-02-04 RX ORDER — SODIUM CHLORIDE 9 MG/ML
1000 INJECTION, SOLUTION INTRAVENOUS ONCE
Refills: 0 | Status: DISCONTINUED | OUTPATIENT
Start: 2023-02-04 | End: 2023-02-04

## 2023-02-04 RX ORDER — OXYTOCIN 10 UNIT/ML
333.33 VIAL (ML) INJECTION
Qty: 20 | Refills: 0 | Status: DISCONTINUED | OUTPATIENT
Start: 2023-02-04 | End: 2023-02-05

## 2023-02-04 RX ORDER — KETOROLAC TROMETHAMINE 30 MG/ML
30 SYRINGE (ML) INJECTION ONCE
Refills: 0 | Status: DISCONTINUED | OUTPATIENT
Start: 2023-02-04 | End: 2023-02-04

## 2023-02-04 RX ORDER — OXYTOCIN 10 UNIT/ML
41.67 VIAL (ML) INJECTION
Qty: 20 | Refills: 0 | Status: DISCONTINUED | OUTPATIENT
Start: 2023-02-04 | End: 2023-02-05

## 2023-02-04 RX ORDER — OXYCODONE HYDROCHLORIDE 5 MG/1
5 TABLET ORAL ONCE
Refills: 0 | Status: DISCONTINUED | OUTPATIENT
Start: 2023-02-04 | End: 2023-02-06

## 2023-02-04 RX ORDER — BENZOCAINE 10 %
1 GEL (GRAM) MUCOUS MEMBRANE EVERY 6 HOURS
Refills: 0 | Status: DISCONTINUED | OUTPATIENT
Start: 2023-02-04 | End: 2023-02-06

## 2023-02-04 RX ORDER — IBUPROFEN 200 MG
600 TABLET ORAL EVERY 6 HOURS
Refills: 0 | Status: COMPLETED | OUTPATIENT
Start: 2023-02-04 | End: 2024-01-03

## 2023-02-04 RX ORDER — LANOLIN
1 OINTMENT (GRAM) TOPICAL EVERY 6 HOURS
Refills: 0 | Status: DISCONTINUED | OUTPATIENT
Start: 2023-02-04 | End: 2023-02-06

## 2023-02-04 RX ORDER — SIMETHICONE 80 MG/1
80 TABLET, CHEWABLE ORAL EVERY 4 HOURS
Refills: 0 | Status: DISCONTINUED | OUTPATIENT
Start: 2023-02-04 | End: 2023-02-06

## 2023-02-04 RX ORDER — SODIUM CHLORIDE 9 MG/ML
1000 INJECTION, SOLUTION INTRAVENOUS
Refills: 0 | Status: DISCONTINUED | OUTPATIENT
Start: 2023-02-04 | End: 2023-02-04

## 2023-02-04 RX ORDER — SODIUM CHLORIDE 9 MG/ML
3 INJECTION INTRAMUSCULAR; INTRAVENOUS; SUBCUTANEOUS EVERY 8 HOURS
Refills: 0 | Status: DISCONTINUED | OUTPATIENT
Start: 2023-02-04 | End: 2023-02-06

## 2023-02-04 RX ORDER — CHLORHEXIDINE GLUCONATE 213 G/1000ML
1 SOLUTION TOPICAL ONCE
Refills: 0 | Status: DISCONTINUED | OUTPATIENT
Start: 2023-02-04 | End: 2023-02-04

## 2023-02-04 RX ORDER — HYDROCORTISONE 1 %
1 OINTMENT (GRAM) TOPICAL EVERY 6 HOURS
Refills: 0 | Status: DISCONTINUED | OUTPATIENT
Start: 2023-02-04 | End: 2023-02-06

## 2023-02-04 RX ORDER — OXYTOCIN 10 UNIT/ML
VIAL (ML) INJECTION
Qty: 30 | Refills: 0 | Status: DISCONTINUED | OUTPATIENT
Start: 2023-02-04 | End: 2023-02-04

## 2023-02-04 RX ORDER — OXYCODONE HYDROCHLORIDE 5 MG/1
5 TABLET ORAL
Refills: 0 | Status: DISCONTINUED | OUTPATIENT
Start: 2023-02-04 | End: 2023-02-06

## 2023-02-04 RX ORDER — AER TRAVELER 0.5 G/1
1 SOLUTION RECTAL; TOPICAL EVERY 4 HOURS
Refills: 0 | Status: DISCONTINUED | OUTPATIENT
Start: 2023-02-04 | End: 2023-02-06

## 2023-02-04 RX ORDER — DIBUCAINE 1 %
1 OINTMENT (GRAM) RECTAL EVERY 6 HOURS
Refills: 0 | Status: DISCONTINUED | OUTPATIENT
Start: 2023-02-04 | End: 2023-02-06

## 2023-02-04 RX ORDER — MAGNESIUM HYDROXIDE 400 MG/1
30 TABLET, CHEWABLE ORAL
Refills: 0 | Status: DISCONTINUED | OUTPATIENT
Start: 2023-02-04 | End: 2023-02-06

## 2023-02-04 RX ORDER — TETANUS TOXOID, REDUCED DIPHTHERIA TOXOID AND ACELLULAR PERTUSSIS VACCINE, ADSORBED 5; 2.5; 8; 8; 2.5 [IU]/.5ML; [IU]/.5ML; UG/.5ML; UG/.5ML; UG/.5ML
0.5 SUSPENSION INTRAMUSCULAR ONCE
Refills: 0 | Status: DISCONTINUED | OUTPATIENT
Start: 2023-02-04 | End: 2023-02-06

## 2023-02-04 RX ORDER — PRAMOXINE HYDROCHLORIDE 150 MG/15G
1 AEROSOL, FOAM RECTAL EVERY 4 HOURS
Refills: 0 | Status: DISCONTINUED | OUTPATIENT
Start: 2023-02-04 | End: 2023-02-06

## 2023-02-04 RX ORDER — DIPHENHYDRAMINE HCL 50 MG
25 CAPSULE ORAL EVERY 6 HOURS
Refills: 0 | Status: DISCONTINUED | OUTPATIENT
Start: 2023-02-04 | End: 2023-02-06

## 2023-02-04 RX ADMIN — Medication 125 MILLIUNIT(S)/MIN: at 20:59

## 2023-02-04 RX ADMIN — Medication 30 MILLIGRAM(S): at 21:23

## 2023-02-04 RX ADMIN — Medication 2 MILLIUNIT(S)/MIN: at 17:05

## 2023-02-04 RX ADMIN — SODIUM CHLORIDE 125 MILLILITER(S): 9 INJECTION, SOLUTION INTRAVENOUS at 16:40

## 2023-02-04 NOTE — OB RN TRIAGE NOTE - NSICDXPASTSURGICALHX_GEN_ALL_CORE_FT
PAST SURGICAL HISTORY:  No significant past surgical history      PAST SURGICAL HISTORY:  No significant past surgical history

## 2023-02-04 NOTE — OB RN DELIVERY SUMMARY - NSSELHIDDEN_OBGYN_ALL_OB_FT
[NS_DeliveryAttending1_OBGYN_ALL_OB_FT:[id number]==],[NS_DeliveryRN_OBGYN_ALL_OB_FT:[id number]=]

## 2023-02-04 NOTE — OB PROVIDER H&P - HISTORY OF PRESENT ILLNESS
26yo  iup at 38.2wks with ? ROM at 107pm, + ctx pain every 5 min, denies vaginal bleeding, + FM.   Pt has hx of COVID +  and , vaccinated x 1 [facility]  OB PNC: pt reports overall pregnancy unremarkable complicated with anemia

## 2023-02-04 NOTE — OB RN DELIVERY SUMMARY - NS_SEPSISRSKCALC_OBGYN_ALL_OB_FT
EOS calculated successfully. EOS Risk Factor: 0.5/1000 live births ([facility] national incidence); GA=38w2d; Temp=98.24; ROM=18.833; GBS='Negative'; Antibiotics='No antibiotics or any antibiotics < 2 hrs prior to birth'

## 2023-02-04 NOTE — OB RN DELIVERY SUMMARY - NSPOSTDELSIGNOUT_OBGYN_ALL_OB
Patient calls here requesting refill rx   sertraline (ZOLOFT) 100 MG tablet 45 tablet 0 7/28/2021     Sig: TAKE 1 AND 1/2 TABLETS BY MOUTH DAILY    Sent to pharmacy as: Sertraline HCl 100 MG Oral Tablet (ZOLOFT)    Class: Eprescribe    E-Prescribing Status: Receipt confirmed by pharmacy (7/28/2021 11:38 AM CDT)        Last appointment: 3/15/2021  Follow up advised: 1 month, NOS 6/3/21, NOS 8/17/2021  Appointment: 10/26/2021  Medication to be filled at [facility] Gallo.    Patient was informed of NOS policy as she has had 2 NOS and can risk being discharged from practice d/t NOS. Patient states good understanding of same. Patient was transferred to PSR to reschedule appointment.     Is it ok to refill?   
Provider e-prescribed prescription to the pharmacy.   
Yes

## 2023-02-04 NOTE — DISCHARGE NOTE OB - PRINCIPAL DIAGNOSIS
Patient is a 50 year old male (likely undomiciled) with PMHx alcohol abuse, schizophrenia? p/w AMS likely 2/2 alcohol use vs schizophrenia. Vaginal delivery

## 2023-02-04 NOTE — OB RN DELIVERY SUMMARY - NS_RNDELIVATTEST_OBGYN_ALL_OB
Laps, needles and instrument count was correct/Laps, needles and instrument count was INCORRECT, and therefore X-RAY WAS ORDERED

## 2023-02-04 NOTE — PROGRESS NOTE ADULT - SUBJECTIVE AND OBJECTIVE BOX
S:  Pt seen and examined for cervical change     comfortable with epidural     O:   T(C): 36.4 (17:35)  HR: 132 (17:46)  BP: 138/59 (17:46)  RR: 15 (15:04)  SpO2: 100% (17:46)  SVE: 8/80/-2 forebag AROM'd clear fluid           A/P: 27y admitted for srom in labor   -Continue current management  -Anticipate   -    L MD Akbar

## 2023-02-04 NOTE — DISCHARGE NOTE OB - CARE PROVIDER_API CALL
Joseph Moraes)  MaternalFetal Medicine; Obstetrics and Gynecology  [address] [place] [id number]  Phone: [phone]  Fax: [phone]  Follow Up Time:

## 2023-02-04 NOTE — DISCHARGE NOTE OB - PATIENT PORTAL LINK FT
You can access the FollowMyHealth Patient Portal offered by [facility] by registering at the following website: [email or web address]. By joining [facility]’s FollowMyHealth portal, you will also be able to view your health information using other applications (apps) compatible with our system.

## 2023-02-04 NOTE — OB RN DELIVERY SUMMARY - NS_DELIVERYATTENDING1_OBGYN_ALL_OB_FT
Assessment and Plan:   MEDICARE WELLNESS VISIT      Problem List Items Addressed This Visit     Benign essential hypertension - Primary           Preventive health issues were discussed with patient, and age appropriate screening tests were ordered as noted in patient's After Visit Summary  Personalized health advice and appropriate referrals for health education or preventive services given if needed, as noted in patient's After Visit Summary  History of Present Illness:     Patient presents for Welcome to Medicare visit       Patient Care Team:  Denisha Fuller DO as PCP - MD Miladys Amaya DO Yuvonne Spice, MD Geraldyne Mediate, MD (Radiation Oncology)     Review of Systems:     Review of Systems   Problem List:     Patient Active Problem List   Diagnosis    Benign essential hypertension    Renal cyst    Liver lesion    Pulmonary nodule    Combined arterial insufficiency and corporo-venous occlusive erectile dysfunction    Prostate cancer ([address]    Coronary artery disease of native artery of native heart with stable angina pectoris ([address] )    DDD (degenerative disc disease), cervical    S/P coronary artery stent placement    Vitamin D deficiency    Abnormal radionuclide bone scan    Bone metastases ([address] )    Calculus of kidney    Mixed hyperlipidemia    Localized edema    Chronic pain of left knee    Bilateral hearing loss    Chemotherapy-induced neutropenia ([address] )    Antineoplastic chemotherapy induced anemia    Chronic kidney disease, stage III (moderate) ([address] )    Urinary frequency    Breast pain, right      Past Medical and Surgical History:     Past Medical History:   Diagnosis Date    Anemia     last assessed  1/7/14     BRCA1 negative     BRCA2 negative     Cancer ([address] )     PROSTATE, BONE CANCER    Coronary artery disease     DDD (degenerative disc disease), cervical     Hypercholesteremia     Hypertension     Kidney stone Sheela Webber MD kidney stones    Polyp of sigmoid colon     Prostate cancer [facility]     Renal disorder     elevated serum creat    Tinnitus     unspecified laterality / last assessed 4/9/13    Vitamin D deficiency      Past Surgical History:   Procedure Laterality Date    BREAST EXCISIONAL BIOPSY Left     age 15 - benign    CARDIAC SURGERY      CARD CATH W/ STENTS    COLONOSCOPY      CORONARY ANGIOPLASTY WITH STENT PLACEMENT      FL RETROGRADE PYELOGRAM  9/11/2019    HEMORRHOID SURGERY      NH CYSTO/URETERO W/LITHOTRIPSY &INDWELL STENT INSRT Left 9/11/2019    Procedure: CYSTO, URETEROSCOPY W/HOLMIUM LASER, BASKET STONE EXTRACTION, RETROGRADE PYELOGRAM, STENT EXCHANGE;  Surgeon: Alecia Gray MD;  Location[facility];  Service: Urology    NH CYSTOURETHROSCOPY,FULGUR 0 5-2 CM LESN N/A 9/8/2021    Procedure: TRANSURETHRAL RESECTION OF BLADDER TUMOR (TURBT);   Surgeon: Alecia Gray MD;  [facility];  Service: Urology    PROSTATE BIOPSY  10/24/2018    TONSILLECTOMY        Family History:     Family History   Problem Relation Age of Onset    Breast cancer Mother 79    Hypertension Father     No Known Problems Sister     No Known Problems Maternal Grandmother     No Known Problems Maternal Grandfather     No Known Problems Paternal Grandmother     No Known Problems Paternal Grandfather     No Known Problems Sister     No Known Problems Maternal Aunt     No Known Problems Maternal Aunt     No Known Problems Maternal Aunt     No Known Problems Paternal Aunt     No Known Problems Paternal Aunt     Cancer Cousin         bladder cancer      Social History:     Social History     Socioeconomic History    Marital status: /Civil Union     Spouse name: None    Number of children: None    Years of education: None    Highest education level: None   Occupational History    Occupation: consultant   Tobacco Use    Smoking status: Never Smoker    Smokeless tobacco: Never Used   Vaping Use    Vaping Use: Never used   Substance and Sexual Activity    Alcohol use: Yes     Alcohol/week: 2 0 - 3 0 standard drinks     Types: 2 - 3 Glasses of wine per week     Comment: Occuational / social     Drug use: No    Sexual activity: None   Other Topics Concern    None   Social History Narrative    Always uses seat belt     Daily caffeine consumption 2-3 servings a day     Feels safe at home     Social Determinants of Health     Financial Resource Strain: Not on file   Food Insecurity: Not on file   Transportation Needs: Not on file   Physical Activity: Not on file   Stress: Not on file   Social Connections: Not on file   Intimate Partner Violence: Not on file   Housing Stability: Not on file      Medications and Allergies:     Current Outpatient Medications   Medication Sig Dispense Refill    aspirin (ECOTRIN LOW STRENGTH) 81 mg EC tablet Take 81 mg by mouth daily       atorvastatin (LIPITOR) 40 mg tablet Take 40 mg by mouth daily at bedtime       Calcium 500 MG tablet Take 1,000 mg by mouth daily       cholecalciferol (VITAMIN D3) 1,000 units tablet Take 1,000 Units by mouth daily       hydrochlorothiazide (HYDRODIURIL) 25 mg tablet Take 1 tablet (25 mg total) by mouth daily 90 tablet 1    leuprolide (LUPRON DEPOT 6 MONTH KIT) 45 mg Inject 45 mg into a muscle every 6 (six) months 45 mg 0    losartan (COZAAR) 100 MG tablet Take 1 tablet (100 mg total) by mouth daily 90 tablet 1    MELATONIN PO Take 1 tablet by mouth daily at bedtime as needed       metoprolol succinate (TOPROL-XL) 25 mg 24 hr tablet Take 1 tablet (25 mg total) by mouth daily 90 tablet 1    tamsulosin (FLOMAX) 0 4 mg take 1 capsule by mouth once daily with dinner 90 capsule 3    Zoledronic Acid (ZOMETA IV) Infuse into a venous catheter every 3 (three) months      Mirabegron ER 50 MG TB24 Take 1 tablet (50 mg total) by mouth in the morning 30 tablet 12     No current facility-administered medications for this visit  Allergies   Allergen Reactions    Other Rash     bandaids rash       Immunizations:     Immunization History   Administered Date(s) Administered    COVID-19 MODERNA VACC 0 5 ML IM 01/11/2021, 02/08/2021, 10/18/2021, 04/08/2022    H1N1, All Formulations 02/01/2010    INFLUENZA 09/17/2015, 09/23/2016, 08/20/2020, 11/12/2021    Influenza Split High Dose Preservative Free IM 09/04/2014, 09/17/2015, 09/23/2016, 10/06/2017    Influenza, high dose seasonal 0 7 mL 09/14/2018, 10/21/2019    Influenza, seasonal, injectable 09/20/2012, 09/17/2013    Pneumococcal Conjugate 13-Valent 03/16/2015    Pneumococcal Polysaccharide PPV23 07/26/2011    Tdap 07/26/2011, 01/17/2022    Zoster 01/10/2014    Zoster Vaccine Recombinant 08/20/2020, 11/01/2020      Health Maintenance: There are no preventive care reminders to display for this patient  Topic Date Due    Pneumococcal Vaccine: 65+ Years (3 - PPSV23 or PCV20) 07/26/2016      Medicare Screening Tests and Risk Assessments:     Heather Lobato is here for his Subsequent Wellness visit  Last Medicare Wellness visit information reviewed, patient interviewed and updates made to the record today  Health Risk Assessment:   Patient rates overall health as very good  Patient feels that their physical health rating is slightly better  Patient is very satisfied with their life  Eyesight was rated as same  Hearing was rated as slightly worse  Patient feels that their emotional and mental health rating is same  Patients states they are never, rarely angry  Patient states they are sometimes unusually tired/fatigued  Pain experienced in the last 7 days has been some  Patient's pain rating has been 2/10  Patient states that he has experienced no weight loss or gain in last 6 months  Depression Screening:   PHQ-2 Score: 0      Fall Risk Screening:    In the past year, patient has experienced: no history of falling in past year      Home Safety:  Patient does not have trouble with stairs inside or outside of their home  Patient has working smoke alarms and has no working carbon monoxide detector  Home safety hazards include: none  Nutrition:   Current diet is Regular  Medications:   Patient is currently taking over-the-counter supplements  OTC medications include: Listed with all medications  Patient is able to manage medications  Activities of Daily Living (ADLs)/Instrumental Activities of Daily Living (IADLs):   Walk and transfer into and out of bed and chair?: Yes  Dress and groom yourself?: Yes    Bathe or shower yourself?: Yes    Feed yourself? Yes  Do your laundry/housekeeping?: Yes  Manage your money, pay your bills and track your expenses?: Yes  Make your own meals?: Yes    Do your own shopping?: Yes    Previous Hospitalizations:   Any hospitalizations or ED visits within the last 12 months?: No      Advance Care Planning:   Living will: Yes    Durable POA for healthcare:  Yes    Advanced directive: Yes    Advanced directive counseling given: Yes    Five wishes given: Yes    End of Life Decisions reviewed with patient: Yes    Provider agrees with end of life decisions: Yes      Cognitive Screening:   Provider or family/friend/caregiver concerned regarding cognition?: No    PREVENTIVE SCREENINGS      Cardiovascular Screening:    General: Screening Not Indicated and History Lipid Disorder      Diabetes Screening:     General: Screening Current      Colorectal Cancer Screening:     General: Risks and Benefits Discussed      Prostate Cancer Screening:    General: History Prostate Cancer and Screening Not Indicated      Osteoporosis Screening:    General: Risks and Benefits Discussed      Abdominal Aortic Aneurysm (AAA) Screening:        General: Risks and Benefits Discussed      Lung Cancer Screening:     General: Screening Not Indicated      Hepatitis C Screening:    General: Risks and Benefits Discussed    Screening, Brief Intervention, and Referral to Treatment (SBIRT)    Screening  Typical number of drinks in a day: 0  Typical number of drinks in a week: 2  Interpretation: Low risk drinking behavior  AUDIT-C Screenin) How often did you have a drink containing alcohol in the past year? 2 to 4 times a month  2) How many drinks did you have on a typical day when you were drinking in the past year?  1 to 2  3) How often did you have 6 or more drinks on one occasion in the past year? never    AUDIT-C Score: 2  Interpretation: Score 0-3 (male): Negative screen for alcohol misuse    Single Item Drug Screening:  How often have you used an illegal drug (including marijuana) or a prescription medication for non-medical reasons in the past year? never    Single Item Drug Screen Score: 0  Interpretation: Negative screen for possible drug use disorder    No exam data present     Physical Exam:     /70 (BP Location: Left arm, Patient Position: Sitting, Cuff Size: Adult)   Pulse 67   Temp 97 5 °F (36 4 °C) (Temporal)   Resp 16   Ht 5' 6 93" (1 7 m)   Wt 95 7 kg (211 lb)   SpO2 98%   BMI 33 12 kg/m²     Physical Exam     Michael Crouch, DO

## 2023-02-04 NOTE — OB PROVIDER TRIAGE NOTE - NSOBPROVIDERNOTE_OBGYN_ALL_OB_FT
28yo  iup at 38.2wks with SROM- clear   admit  epidural   yaneli mao  obtained consents  case discussed with

## 2023-02-04 NOTE — DISCHARGE NOTE OB - MATERIALS PROVIDED
[facility] [place] Screening Program/[place]  Immunization Record/Breastfeeding Log/Guide to Postpartum Care/[facility] Hearing Screen Program/Back To Sleep Handout/Shaken Baby Prevention Handout/Breastfeeding Guide and Packet/Birth Certificate Instructions/Discharge Medication Information for Patients and Families Pocket Guide

## 2023-02-04 NOTE — DISCHARGE NOTE OB - NS MD DC FALL RISK RISK
For information on Fall & Injury Prevention, visit: [email or web address] OR  [email or web address] OR  https://www.cdc.gov/steadi/patient.html

## 2023-02-04 NOTE — OB PROVIDER H&P - ASSESSMENT
26yo  iup at 38.2wks with SROM- clear in latent labor  admit  expectant management   covid sent  plan of care discussed with Dr. Webber

## 2023-02-04 NOTE — DISCHARGE NOTE OB - CARE PLAN
Principal Discharge DX:	Vaginal delivery  Assessment and plan of treatment:	pelvic rest  follow up with Dr. Moraes in 6 weeks   1

## 2023-02-04 NOTE — OB RN PATIENT PROFILE - AS SC BRADEN SENSORY
Spoke with patient she was notified as per Dr March to schedule an appointment with Dr Maloney. west scheduled on 9/29 at 9:15. Patient notified of date and time of appointment   (4) no impairment

## 2023-02-04 NOTE — OB RN TRIAGE NOTE - FALL HARM RISK - TYPE OF ASSESSMENT
KK, pt had xray done can you let me know when the note is done so I can work on PA for MRI. Thanks   Admission

## 2023-02-04 NOTE — OB PROVIDER DELIVERY SUMMARY - NSPROVIDERDELIVERYNOTE_OBGYN_ALL_OB_FT
viable female. INfant delivered atraumatically, in ROP presentation, nose and mouth bulb suctioned,  delayed cord clamping. Placenta delivered spontaneously and intact. First degree laceration repaired with 2-0 chromic. Periurethral laceration repaired with 3-0 chromic. Good hemostasis.

## 2023-02-04 NOTE — OB RN PATIENT PROFILE - PATIENT REPRESENTATIVE: ( YOU CAN CHOOSE ANY PERSON THAT CAN ASSIST YOU WITH YOUR HEALTH CARE PREFERENCES, DOES NOT HAVE TO BE A SPOUSE, IMMEDIATE FAMILY OR SIGNIFICANT OTHER/PARTNER)
She will go to the emergency room today.  She needs evaluation of the abdominal pain and constipation and nausea and vomiting.  She will need to follow-up with the gastrointestinal physiologist and also with her other physicians.  The [facility] PCP records were requested.  She is started on Linzess 72 for the chronic stuck constipation.  
Yes

## 2023-02-05 LAB
COVID-19 SPIKE DOMAIN AB INTERP: POSITIVE
COVID-19 SPIKE DOMAIN ANTIBODY RESULT: >250 U/ML — HIGH
SARS-COV-2 IGG+IGM SERPL QL IA: >250 U/ML — HIGH
SARS-COV-2 IGG+IGM SERPL QL IA: POSITIVE
T PALLIDUM AB TITR SER: NEGATIVE — SIGNIFICANT CHANGE UP

## 2023-02-05 RX ORDER — IBUPROFEN 200 MG
600 TABLET ORAL EVERY 6 HOURS
Refills: 0 | Status: DISCONTINUED | OUTPATIENT
Start: 2023-02-05 | End: 2023-02-06

## 2023-02-05 RX ORDER — SENNA PLUS 8.6 MG/1
1 TABLET ORAL
Refills: 0 | Status: DISCONTINUED | OUTPATIENT
Start: 2023-02-05 | End: 2023-02-06

## 2023-02-05 RX ORDER — FERROUS SULFATE 325(65) MG
325 TABLET ORAL DAILY
Refills: 0 | Status: DISCONTINUED | OUTPATIENT
Start: 2023-02-05 | End: 2023-02-06

## 2023-02-05 RX ORDER — ASCORBIC ACID 60 MG
500 TABLET,CHEWABLE ORAL DAILY
Refills: 0 | Status: DISCONTINUED | OUTPATIENT
Start: 2023-02-05 | End: 2023-02-06

## 2023-02-05 RX ORDER — IBUPROFEN 200 MG
1 TABLET ORAL
Qty: 0 | Refills: 0 | DISCHARGE
Start: 2023-02-05

## 2023-02-05 RX ADMIN — Medication 600 MILLIGRAM(S): at 07:33

## 2023-02-05 RX ADMIN — Medication 975 MILLIGRAM(S): at 03:25

## 2023-02-05 RX ADMIN — Medication 1 TABLET(S): at 13:00

## 2023-02-05 RX ADMIN — Medication 975 MILLIGRAM(S): at 17:21

## 2023-02-05 RX ADMIN — Medication 600 MILLIGRAM(S): at 13:42

## 2023-02-05 RX ADMIN — Medication 600 MILLIGRAM(S): at 08:29

## 2023-02-05 RX ADMIN — Medication 975 MILLIGRAM(S): at 03:55

## 2023-02-05 RX ADMIN — Medication 600 MILLIGRAM(S): at 12:57

## 2023-02-05 NOTE — PROGRESS NOTE ADULT - SUBJECTIVE AND OBJECTIVE BOX
post epidural d/c follow-up:    Pt states ambulating and freely voiding.  Denies headache/nausea/vomiting.  VSS.  States no concerns at this time.      ICU Vital Signs Last 24 Hrs  T(C): 36.4 (05 Feb 2023 05:55), Max: 36.8 (04 Feb 2023 15:04)  T(F): 97.6 (05 Feb 2023 05:55), Max: 98.3 (05 Feb 2023 01:48)  HR: 84 (05 Feb 2023 05:55) (69 - 132)  BP: 98/49 (05 Feb 2023 05:55) (87/51 - 151/64)  BP(mean): --  ABP: --  ABP(mean): --  RR: 17 (05 Feb 2023 05:55) (14 - 18)  SpO2: 97% (05 Feb 2023 05:55) (70% - 100%)    O2 Parameters below as of 05 Feb 2023 05:55  Patient On (Oxygen Delivery Method): room air

## 2023-02-05 NOTE — PROGRESS NOTE ADULT - SUBJECTIVE AND OBJECTIVE BOX
S: 26yo  PPD#1 s/p  with 1st degree Laceration & Periurethral tear, qbl 50. c/b [place]. Patient feels well. Pain is well controlled. She is tolerating a regular diet and passing flatus. She is voiding spontaneously, and ambulating without difficulty. Denies: Headaches, CP/SOB. Denies lightheadedness/dizziness. Denies N/V.    O:  Vitals:  Vital Signs Last 24 Hrs  T(C): 36.4 (2023 05:55), Max: 36.8 (2023 15:04)  T(F): 97.6 (2023 05:55), Max: 98.3 (2023 01:48)  HR: 84 (2023 05:55) (69 - 132)  BP: 98/49 (2023 05:55) (87/51 - 151/64)  BP(mean): --  RR: 17 (2023 05:55) (14 - 18)  SpO2: 97% (2023 05:55) (70% - 100%)    Parameters below as of 2023 05:55  Patient On (Oxygen Delivery Method): room air        MEDICATIONS  (STANDING):  acetaminophen     Tablet .. 975 milliGRAM(s) Oral <User Schedule>  ascorbic acid 500 milliGRAM(s) Oral daily  diphtheria/tetanus/pertussis (acellular) Vaccine (Adacel) 0.5 milliLiter(s) IntraMuscular once  ferrous    sulfate 325 milliGRAM(s) Oral daily  ibuprofen  Tablet. 600 milliGRAM(s) Oral every 6 hours  prenatal multivitamin 1 Tablet(s) Oral daily  senna 1 Tablet(s) Oral two times a day  sodium chloride 0.9% lock flush 3 milliLiter(s) IV Push every 8 hours      Labs:  Blood type: B Positive  Rubella IgG: RPR: Negative                          9.3<L>   9.10 >-----------< 221    ( 04 @ 15:30 )             30.5<L>          Physical Exam:  General: NAD, Alert, OX4  Abdomen: soft, non-tender, non-distended, fundus firm  Vaginal: Lochia/ light - wnl, Perineum intact  Extremities: No erythema/edema, No calf tenderness    A/P: 26yo PPD#1 s/p .  Patient is stable and doing well post-partum.   - Pain well controlled, continue current pain regimen  - Increase ambulation, SCDs when in bed  - Continue regular diet/Hydration  -Perineal care reviewed  -PP checkup in 6 weeks  -Discharge Planning    Amelia Casas NP S: 26yo  PPD#1 s/p  with 1st degree Laceration & Periurethral tear, qbl 50. c/b [place]. Patient feels well. Pain is well controlled. She is tolerating a regular diet and passing flatus. She is voiding spontaneously, and ambulating without difficulty. Denies: Headaches, CP/SOB. Denies lightheadedness/dizziness. Denies N/V.    O:  Vitals:  Vital Signs Last 24 Hrs  T(C): 36.4 (2023 05:55), Max: 36.8 (2023 15:04)  T(F): 97.6 (2023 05:55), Max: 98.3 (2023 01:48)  HR: 84 (2023 05:55) (69 - 132)  BP: 98/49 (2023 05:55) (87/51 - 151/64)  BP(mean): --  RR: 17 (2023 05:55) (14 - 18)  SpO2: 97% (2023 05:55) (70% - 100%)    Parameters below as of 2023 05:55  Patient On (Oxygen Delivery Method): room air        MEDICATIONS  (STANDING):  acetaminophen     Tablet .. 975 milliGRAM(s) Oral <User Schedule>  ascorbic acid 500 milliGRAM(s) Oral daily  diphtheria/tetanus/pertussis (acellular) Vaccine (Adacel) 0.5 milliLiter(s) IntraMuscular once  ferrous    sulfate 325 milliGRAM(s) Oral daily  ibuprofen  Tablet. 600 milliGRAM(s) Oral every 6 hours  prenatal multivitamin 1 Tablet(s) Oral daily  senna 1 Tablet(s) Oral two times a day  sodium chloride 0.9% lock flush 3 milliLiter(s) IV Push every 8 hours      Labs:  Blood type: B Positive  Rubella IgG: RPR: Negative                          9.3<L>   9.10 >-----------< 221    ( 04 @ 15:30 )             30.5<L>          Physical Exam:  General: NAD, Alert, OX4  Abdomen: soft, non-tender, non-distended, fundus firm  Vaginal: Lochia/ light - wnl, Perineum intact  Extremities: No erythema/edema, No calf tenderness    A/P: 26yo PPD#1 s/p .  Patient is stable and doing well post-partum.   - Pain well controlled, continue current pain regimen  - Increase ambulation, SCDs when in bed  - Continue regular diet/Hydration  -Perineal care reviewed  -PP checkup in 6 weeks  -Discharge Planning    Amelia Casas NP    OB attending  Pt seen and examined and agree with above  d.c planning   L MD Akbar

## 2023-02-06 VITALS
RESPIRATION RATE: 18 BRPM | HEART RATE: 77 BPM | SYSTOLIC BLOOD PRESSURE: 101 MMHG | DIASTOLIC BLOOD PRESSURE: 58 MMHG | TEMPERATURE: 98 F | OXYGEN SATURATION: 99 %

## 2023-02-06 RX ADMIN — Medication 975 MILLIGRAM(S): at 01:47

## 2023-02-06 RX ADMIN — Medication 975 MILLIGRAM(S): at 00:47

## 2023-02-06 RX ADMIN — SENNA PLUS 1 TABLET(S): 8.6 TABLET ORAL at 06:17

## 2023-02-06 RX ADMIN — Medication 600 MILLIGRAM(S): at 07:17

## 2023-02-06 RX ADMIN — Medication 600 MILLIGRAM(S): at 06:17

## 2023-02-07 ENCOUNTER — APPOINTMENT (OUTPATIENT)
Dept: OBGYN | Facility: CLINIC | Age: 28
End: 2023-02-07

## 2023-02-19 NOTE — OB PROVIDER H&P - PRO FEEDING PLAN INFANT OB
Patient cleared for discharge from Dr. Tereso Rodriguez. Order given via [facility]. 1500 - Patient cleared for discharge from Dr. Morales lee via [facility]. Discharge order placed. breastfeeding exclusively

## 2023-03-13 ENCOUNTER — APPOINTMENT (OUTPATIENT)
Dept: OBGYN | Facility: CLINIC | Age: 28
End: 2023-03-13
Payer: MEDICAID

## 2023-03-13 VITALS
SYSTOLIC BLOOD PRESSURE: 108 MMHG | BODY MASS INDEX: 26.53 KG/M2 | HEIGHT: 67 IN | DIASTOLIC BLOOD PRESSURE: 71 MMHG | WEIGHT: 169 LBS

## 2023-03-13 PROCEDURE — 0503F POSTPARTUM CARE VISIT: CPT

## 2023-03-13 RX ORDER — NORETHINDRONE 0.35 MG/1
0.35 TABLET ORAL DAILY
Qty: 1 | Refills: 3 | Status: ACTIVE | COMMUNITY
Start: 2023-03-13 | End: 1900-01-01

## 2023-03-13 NOTE — OB RN TRIAGE NOTE - NS_DISCHARGEPRINT_OBGYN_ALL_OB
Mom called and schedule an appointment with Dr. Jarrett for 4-10-23.    General OB Triage Discharge Instructions

## 2023-03-13 NOTE — HISTORY OF PRESENT ILLNESS
[Complications:___] : no complications [Delivery Date: ___] : on [unfilled] [] : delivered by vaginal delivery [Female] : Delivery History: baby girl [Wt. ___] : weighing [unfilled] [Breastfeeding] : currently nursing [BF with Difficulty] : nursing without difficulty [Back to Normal] : is back to normal in size [Normal] : the vagina was normal [Examination Of The Breasts] : breasts are normal [Doing Well] : is doing well [No Sign of Infection] : is showing no signs of infection [Excellent Pain Control] : has excellent pain control [None] : None [FreeTextEntry8] : HPI: 28 y/o F s/p  on 2023 presenting for postpartum visit. Liveborn female. She is breastfeeding. She is feeling well and is without complaints. Her mood is stable. [de-identified] : PP exam WNL [de-identified] : 28 y/o F presenting for 6 week PP visit\par -normal PP exam\par -Patient cleared to resume intercourse and exercise\par -Patient counseled on contraception options, desires mini pill \par -f/u for 3 months for annual

## 2023-04-26 RX ORDER — NORETHINDRONE ACETATE AND ETHINYL ESTRADIOL AND FERROUS FUMARATE 1MG-20(21)
1-20 KIT ORAL DAILY
Qty: 3 | Refills: 0 | Status: ACTIVE | COMMUNITY
Start: 2023-04-26 | End: 1900-01-01

## 2023-05-05 NOTE — OB RN TRIAGE NOTE - PRO INTERPRETER NEED 2
I spoke to pt and told him CT results as per Dr Ruiz.    Pt confirmed back pain and would like to address bone lesion instead of just waiting and monitoring    Pt also stated that for the past week he has been having abdominal pain under rib cage travelling from right to left side worse with activity rated 5-6/10 with activity and 0/10 at rest. He is not constipated he states has had a soft formed bowel movement this AM. Abd pain worse on palpation and worse when he goes from lying to getting up. He will try extra strength tylenol    Pls address regarding bone lesion on back and abd pain   English

## 2023-06-03 NOTE — OB RN PATIENT PROFILE - NS_GESTAGE_OBGYN_ALL_OB_FT
Subjective   Patient ID: Camille is a 42 provided as documented in the AVS.    Thank you for visiting [facility].  Please follow up with your PCP prn.          [id number]

## 2023-07-17 ENCOUNTER — APPOINTMENT (OUTPATIENT)
Dept: OBGYN | Facility: CLINIC | Age: 28
End: 2023-07-17

## 2023-08-08 ENCOUNTER — APPOINTMENT (OUTPATIENT)
Dept: OBGYN | Facility: CLINIC | Age: 28
End: 2023-08-08

## 2023-10-06 ENCOUNTER — APPOINTMENT (OUTPATIENT)
Dept: ANTEPARTUM | Facility: CLINIC | Age: 28
End: 2023-10-06

## 2023-10-06 ENCOUNTER — APPOINTMENT (OUTPATIENT)
Dept: OBGYN | Facility: CLINIC | Age: 28
End: 2023-10-06

## 2023-10-18 ENCOUNTER — APPOINTMENT (OUTPATIENT)
Dept: ANTEPARTUM | Facility: CLINIC | Age: 28
End: 2023-10-18
Payer: MEDICAID

## 2023-10-18 ENCOUNTER — APPOINTMENT (OUTPATIENT)
Dept: OBGYN | Facility: CLINIC | Age: 28
End: 2023-10-18
Payer: MEDICAID

## 2023-10-18 ENCOUNTER — ASOB RESULT (OUTPATIENT)
Age: 28
End: 2023-10-18

## 2023-10-18 VITALS — BODY MASS INDEX: 25.84 KG/M2 | SYSTOLIC BLOOD PRESSURE: 119 MMHG | WEIGHT: 165 LBS | DIASTOLIC BLOOD PRESSURE: 85 MMHG

## 2023-10-18 DIAGNOSIS — Z00.00 ENCOUNTER FOR GENERAL ADULT MEDICAL EXAMINATION W/OUT ABNORMAL FINDINGS: ICD-10-CM

## 2023-10-18 PROCEDURE — 76857 US EXAM PELVIC LIMITED: CPT

## 2023-10-18 PROCEDURE — 99213 OFFICE O/P EST LOW 20 MIN: CPT

## 2023-10-18 PROCEDURE — 76830 TRANSVAGINAL US NON-OB: CPT

## 2023-10-18 RX ORDER — NORETHINDRONE ACETATE AND ETHINYL ESTRADIOL 1; 20 MG/1; UG/1
1-20 TABLET ORAL DAILY
Qty: 3 | Refills: 1 | Status: ACTIVE | COMMUNITY
Start: 2023-10-18 | End: 1900-01-01

## 2023-10-19 LAB
ALBUMIN SERPL ELPH-MCNC: 4.6 G/DL
ALP BLD-CCNC: 76 U/L
ALT SERPL-CCNC: 12 U/L
ANION GAP SERPL CALC-SCNC: 10 MMOL/L
AST SERPL-CCNC: 15 U/L
BILIRUB SERPL-MCNC: 0.2 MG/DL
BUN SERPL-MCNC: 12 MG/DL
CALCIUM SERPL-MCNC: 9.9 MG/DL
CHLORIDE SERPL-SCNC: 101 MMOL/L
CO2 SERPL-SCNC: 27 MMOL/L
CREAT SERPL-MCNC: 0.71 MG/DL
EGFR: 119 ML/MIN/1.73M2
ESTIMATED AVERAGE GLUCOSE: 105 MG/DL
FSH SERPL-MCNC: 3.1 IU/L
GLUCOSE SERPL-MCNC: 94 MG/DL
HBA1C MFR BLD HPLC: 5.3 %
LH SERPL-ACNC: 6.3 IU/L
POTASSIUM SERPL-SCNC: 4.4 MMOL/L
PROLACTIN SERPL-MCNC: 8.2 NG/ML
PROT SERPL-MCNC: 7.6 G/DL
SODIUM SERPL-SCNC: 138 MMOL/L
T4 FREE SERPL-MCNC: 1.2 NG/DL
TSH SERPL-ACNC: 1.64 UIU/ML

## 2023-12-24 NOTE — OB RN TRIAGE NOTE - NS_FETALHEARTHEAR_OBGYN_ALL_OB
Goal Outcome Evaluation:              Outcome Evaluation: Pt new admit this shift for chest pain. Continue current plan of care.          Yes

## 2024-01-22 ENCOUNTER — APPOINTMENT (OUTPATIENT)
Dept: OBGYN | Facility: CLINIC | Age: 29
End: 2024-01-22
Payer: MEDICAID

## 2024-01-22 VITALS
SYSTOLIC BLOOD PRESSURE: 113 MMHG | BODY MASS INDEX: 26.37 KG/M2 | WEIGHT: 168 LBS | DIASTOLIC BLOOD PRESSURE: 70 MMHG | HEIGHT: 67 IN

## 2024-01-22 DIAGNOSIS — Z01.419 ENCOUNTER FOR GYNECOLOGICAL EXAMINATION (GENERAL) (ROUTINE) W/OUT ABNORMAL FINDINGS: ICD-10-CM

## 2024-01-22 DIAGNOSIS — N89.8 OTHER SPECIFIED NONINFLAMMATORY DISORDERS OF VAGINA: ICD-10-CM

## 2024-01-22 PROCEDURE — 0501F PRENATAL FLOW SHEET: CPT

## 2024-01-22 NOTE — PHYSICAL EXAM
[Chaperone Present] : A chaperone was present in the examining room during all aspects of the physical examination [Alert] : alert [Appropriately responsive] : appropriately responsive [No Acute Distress] : no acute distress [Soft] : soft [Non-tender] : non-tender [Non-distended] : non-distended [No HSM] : No HSM [No Lesions] : no lesions [No Mass] : no mass [Oriented x3] : oriented x3 [Examination Of The Breasts] : a normal appearance [No Masses] : no breast masses were palpable [Labia Majora] : normal [Labia Minora] : normal [Normal] : normal [Uterine Adnexae] : normal

## 2024-01-23 ENCOUNTER — APPOINTMENT (OUTPATIENT)
Dept: ANTEPARTUM | Facility: CLINIC | Age: 29
End: 2024-01-23

## 2024-01-23 LAB
ABO + RH PNL BLD: NORMAL
APPEARANCE: CLEAR
BACTERIA: NEGATIVE /HPF
BASOPHILS # BLD AUTO: 0.05 K/UL
BASOPHILS NFR BLD AUTO: 0.5 %
BILIRUBIN URINE: NEGATIVE
BLD GP AB SCN SERPL QL: NORMAL
BLOOD URINE: NEGATIVE
C TRACH RRNA SPEC QL NAA+PROBE: NOT DETECTED
CAST: 0 /LPF
COLOR: YELLOW
EOSINOPHIL # BLD AUTO: 0.12 K/UL
EOSINOPHIL NFR BLD AUTO: 1.2 %
EPITHELIAL CELLS: 4 /HPF
ESTIMATED AVERAGE GLUCOSE: 103 MG/DL
GLUCOSE QUALITATIVE U: NEGATIVE MG/DL
GLUCOSE SERPL-MCNC: 79 MG/DL
HBA1C MFR BLD HPLC: 5.2 %
HBV SURFACE AG SER QL: NONREACTIVE
HCT VFR BLD CALC: 40.3 %
HCV AB SER QL: NONREACTIVE
HCV S/CO RATIO: 0.18 S/CO
HGB BLD-MCNC: 12.9 G/DL
HIV1+2 AB SPEC QL IA.RAPID: NONREACTIVE
IMM GRANULOCYTES NFR BLD AUTO: 0.3 %
KETONES URINE: NEGATIVE MG/DL
LEUKOCYTE ESTERASE URINE: ABNORMAL
LYMPHOCYTES # BLD AUTO: 2.5 K/UL
LYMPHOCYTES NFR BLD AUTO: 24.6 %
MAN DIFF?: NORMAL
MCHC RBC-ENTMCNC: 28.4 PG
MCHC RBC-ENTMCNC: 32 GM/DL
MCV RBC AUTO: 88.6 FL
MICROSCOPIC-UA: NORMAL
MONOCYTES # BLD AUTO: 0.64 K/UL
MONOCYTES NFR BLD AUTO: 6.3 %
N GONORRHOEA RRNA SPEC QL NAA+PROBE: NOT DETECTED
NEUTROPHILS # BLD AUTO: 6.81 K/UL
NEUTROPHILS NFR BLD AUTO: 67.1 %
NITRITE URINE: NEGATIVE
PH URINE: 6
PLATELET # BLD AUTO: 247 K/UL
PROTEIN URINE: NEGATIVE MG/DL
RBC # BLD: 4.55 M/UL
RBC # FLD: 15.2 %
RED BLOOD CELLS URINE: 0 /HPF
REVIEW: NORMAL
SOURCE AMPLIFICATION: NORMAL
SPECIFIC GRAVITY URINE: 1.01
UROBILINOGEN URINE: 0.2 MG/DL
WBC # FLD AUTO: 10.15 K/UL
WHITE BLOOD CELLS URINE: 3 /HPF

## 2024-01-25 ENCOUNTER — NON-APPOINTMENT (OUTPATIENT)
Age: 29
End: 2024-01-25

## 2024-01-25 LAB
BACTERIA UR CULT: NORMAL
LEAD BLD-MCNC: <1 UG/DL
M TB IFN-G BLD-IMP: NEGATIVE
MEV IGG FLD QL IA: >300 AU/ML
MEV IGG+IGM SER-IMP: POSITIVE
MUV AB SER-ACNC: POSITIVE
MUV IGG SER QL IA: 146 AU/ML
QUANTIFERON TB PLUS MITOGEN MINUS NIL: >10 IU/ML
QUANTIFERON TB PLUS NIL: 0.02 IU/ML
QUANTIFERON TB PLUS TB1 MINUS NIL: 0.08 IU/ML
QUANTIFERON TB PLUS TB2 MINUS NIL: 0.06 IU/ML
RUBV IGG FLD-ACNC: 4.9 INDEX
RUBV IGG SER-IMP: POSITIVE
T PALLIDUM AB SER QL IA: NEGATIVE

## 2024-01-26 NOTE — HISTORY OF PRESENT ILLNESS
[FreeTextEntry1] : Patient is a 28 year old P2 presenting for an annual visit. LMP 12/2/23. She is feeling well but c/o vaginal itching, odor and discharge. Denies urinary urgency, frequency and dysuria. She also presents after she had a positive home pregnancy test. She is endorsing occasional nausea and breast tenderness.  no

## 2024-01-26 NOTE — PLAN
[FreeTextEntry1] : 29 y/o female presenting for annual exam with amenorrhea  -f/u PAP and GC/CT done today -f/u prenatal blood work drawn today -Rx sent for terconazole d/t yeast infection on exam  -RTO 2 weeks for first trimester ultrasound and review of prenatal lab results

## 2024-01-30 LAB — CYTOLOGY CVX/VAG DOC THIN PREP: NORMAL

## 2024-02-06 ENCOUNTER — ASOB RESULT (OUTPATIENT)
Age: 29
End: 2024-02-06

## 2024-02-06 ENCOUNTER — NON-APPOINTMENT (OUTPATIENT)
Age: 29
End: 2024-02-06

## 2024-02-06 ENCOUNTER — APPOINTMENT (OUTPATIENT)
Dept: OBGYN | Facility: CLINIC | Age: 29
End: 2024-02-06
Payer: MEDICAID

## 2024-02-06 VITALS — WEIGHT: 166 LBS | DIASTOLIC BLOOD PRESSURE: 71 MMHG | BODY MASS INDEX: 26 KG/M2 | SYSTOLIC BLOOD PRESSURE: 116 MMHG

## 2024-02-06 PROCEDURE — 0502F SUBSEQUENT PRENATAL CARE: CPT

## 2024-02-06 RX ORDER — TERCONAZOLE 4 MG/G
0.4 CREAM VAGINAL
Qty: 1 | Refills: 0 | Status: ACTIVE | COMMUNITY
Start: 2024-01-22 | End: 1900-01-01

## 2024-02-12 ENCOUNTER — APPOINTMENT (OUTPATIENT)
Dept: ANTEPARTUM | Facility: CLINIC | Age: 29
End: 2024-02-12

## 2024-02-12 ENCOUNTER — APPOINTMENT (OUTPATIENT)
Dept: OBGYN | Facility: CLINIC | Age: 29
End: 2024-02-12

## 2024-02-12 LAB
CHROMOSOME13 INTERPRETATION: NORMAL
CHROMOSOME13 TEST RESULT: NORMAL
CHROMOSOME18 INTERPRETATION: NORMAL
CHROMOSOME18 TEST RESULT: NORMAL
CHROMOSOME21 INTERPRETATION: NORMAL
CHROMOSOME21 TEST RESULT: NORMAL
FETAL FRACTION: NORMAL
PERFORMANCE AND LIMITATIONS: NORMAL
SEX CHROMOSOME INTERPRETATION: NORMAL
SEX CHROMOSOME TEST RESULT: NORMAL
VERIFI PRENATAL TEST: NOT DETECTED

## 2024-02-15 NOTE — OB PROVIDER TRIAGE NOTE - HISTORY OF PRESENT ILLNESS
Patient getting transferred to [facility] after Craniotomy. RN gave report to [facility] RN. All belongings charted in flowsheet.    28yo  iup at 38.2wks with ? ROM at 107pm, + ctx pain every 5 min, denies vaginal bleeding, + FM.   Pt has hx of COVID +  and , vaccinated x 1 J&J  OB PNC: pt reports overall pregnancy unremarkable complicated with anemia

## 2024-02-21 ENCOUNTER — APPOINTMENT (OUTPATIENT)
Dept: OBGYN | Facility: CLINIC | Age: 29
End: 2024-02-21
Payer: MEDICAID

## 2024-02-21 ENCOUNTER — APPOINTMENT (OUTPATIENT)
Dept: ANTEPARTUM | Facility: CLINIC | Age: 29
End: 2024-02-21
Payer: MEDICAID

## 2024-02-21 ENCOUNTER — ASOB RESULT (OUTPATIENT)
Age: 29
End: 2024-02-21

## 2024-02-21 VITALS — BODY MASS INDEX: 25.84 KG/M2 | WEIGHT: 165 LBS | SYSTOLIC BLOOD PRESSURE: 110 MMHG | DIASTOLIC BLOOD PRESSURE: 73 MMHG

## 2024-02-21 DIAGNOSIS — Z34.90 ENCOUNTER FOR SUPERVISION OF NORMAL PREGNANCY, UNSPECIFIED, UNSPECIFIED TRIMESTER: ICD-10-CM

## 2024-02-21 PROCEDURE — 0502F SUBSEQUENT PRENATAL CARE: CPT

## 2024-02-21 PROCEDURE — 76801 OB US < 14 WKS SINGLE FETUS: CPT

## 2024-02-21 PROCEDURE — 76813 OB US NUCHAL MEAS 1 GEST: CPT

## 2024-02-24 LAB
ADDITIONAL US: NORMAL
CRL SCAN TWIN B: NORMAL
CRL SCAN: NORMAL
CROWN RUMP LENGTH TWIN B: NORMAL
CROWN RUMP LENGTH: 57.2 MM
DIA MOM: 1.61
DIA VALUE: 369.9 PG/ML
DOWN SYNDROME AGE RISK: NORMAL
DOWN SYNDROME INTERPRETATION: NORMAL
DOWN SYNDROME SCREENING RISK: NORMAL
FIRST TRIMESTER SCREEN COMMENTS: NORMAL
FIRST TRIMESTER SCREEN NOTE: NORMAL
FIRST TRIMESTER SCREEN RESULTS: NORMAL
FIRST TRIMESTER SCREEN TEST RESULTS: NORMAL
GEST. AGE ON COLLECTION DATE: 12.1 WEEKS
HCG MOM: 1.21
HCG VALUE: 118 IU/ML
MATERNAL AGE AT EDD: 28.9 YR
NT MOM TWIN B: NORMAL
NT TWIN B: NORMAL
NUCHAL TRANSLUCENCY (NT): 1.4 MM
NUCHAL TRANSLUCENCY MOM: 0.94
NUMBER OF FETUSES: 1
PAPP-A MOM: 1.28
PAPP-A VALUE: 1021.1 NG/ML
RACE: NORMAL
SONOGRAPHER ID#: NORMAL
TRISOMY 18 AGE RISK: NORMAL
TRISOMY 18 INTERPRETATION: NORMAL
TRISOMY 18 SCREENING RISK: NORMAL
WEIGHT AFP: 165 LBS

## 2024-03-20 ENCOUNTER — NON-APPOINTMENT (OUTPATIENT)
Age: 29
End: 2024-03-20

## 2024-03-20 ENCOUNTER — APPOINTMENT (OUTPATIENT)
Dept: OBGYN | Facility: CLINIC | Age: 29
End: 2024-03-20
Payer: MEDICAID

## 2024-03-20 VITALS
WEIGHT: 169 LBS | BODY MASS INDEX: 26.53 KG/M2 | HEIGHT: 67 IN | DIASTOLIC BLOOD PRESSURE: 64 MMHG | SYSTOLIC BLOOD PRESSURE: 105 MMHG

## 2024-03-20 DIAGNOSIS — Z3A.17 17 WEEKS GESTATION OF PREGNANCY: ICD-10-CM

## 2024-03-20 PROCEDURE — 0502F SUBSEQUENT PRENATAL CARE: CPT

## 2024-03-26 LAB
AFP MOM: 1.28
AFP VALUE: 42.2 NG/ML
ALPHA FETOPROTEIN SERUM COMMENT: NORMAL
ALPHA FETOPROTEIN SERUM INTERPRETATION: NORMAL
ALPHA FETOPROTEIN SERUM RESULTS: NORMAL
ALPHA FETOPROTEIN SERUM TEST RESULTS: NORMAL
GESTATIONAL AGE BASED ON: NORMAL
GESTATIONAL AGE ON COLLECTION DATE: 16.4 WEEKS
INSULIN DEP DIABETES: NO
MATERNAL AGE AT EDD AFP: 28.9 YR
MULTIPLE GESTATION: NO
OSBR RISK 1 IN: 5096
RACE: NORMAL
WEIGHT AFP: 169 LBS

## 2024-04-17 ENCOUNTER — ASOB RESULT (OUTPATIENT)
Age: 29
End: 2024-04-17

## 2024-04-17 ENCOUNTER — APPOINTMENT (OUTPATIENT)
Dept: ANTEPARTUM | Facility: CLINIC | Age: 29
End: 2024-04-17
Payer: MEDICAID

## 2024-04-17 ENCOUNTER — APPOINTMENT (OUTPATIENT)
Dept: OBGYN | Facility: CLINIC | Age: 29
End: 2024-04-17
Payer: MEDICAID

## 2024-04-17 VITALS — BODY MASS INDEX: 26.63 KG/M2 | WEIGHT: 170 LBS | DIASTOLIC BLOOD PRESSURE: 64 MMHG | SYSTOLIC BLOOD PRESSURE: 97 MMHG

## 2024-04-17 PROCEDURE — 0502F SUBSEQUENT PRENATAL CARE: CPT

## 2024-04-17 PROCEDURE — 76805 OB US >/= 14 WKS SNGL FETUS: CPT

## 2024-04-26 NOTE — ED ADULT NURSE NOTE - NS ED NOTE ABUSE RESPONSE YN
Refill Routing Note   Medication(s) are not appropriate for processing by [facility] for the following reason(s):        Outside of protocol  Clarification of medication (Rx) details(current order has 2 SIGs noted)    [facility] action(s):  Route        Medication Therapy Plan: 2 SIGS on the rx: Pharmacy must need just the 1 SIG for more clarity.      Appointments  past 12m or future 3m with PCP    Date Provider   Last Visit   4/25/2024 Siria Goldberg MD   Next Visit   5/16/2024 Siria Goldberg MD   ED visits in past 90 days: 0        Note composed:8:36 AM 04/26/2024          
Yes

## 2024-05-17 ENCOUNTER — APPOINTMENT (OUTPATIENT)
Dept: OBGYN | Facility: CLINIC | Age: 29
End: 2024-05-17
Payer: MEDICAID

## 2024-05-17 VITALS
DIASTOLIC BLOOD PRESSURE: 65 MMHG | WEIGHT: 174 LBS | BODY MASS INDEX: 27.31 KG/M2 | HEIGHT: 67 IN | SYSTOLIC BLOOD PRESSURE: 102 MMHG

## 2024-05-17 DIAGNOSIS — Z3A.24 24 WEEKS GESTATION OF PREGNANCY: ICD-10-CM

## 2024-05-17 PROCEDURE — 0502F SUBSEQUENT PRENATAL CARE: CPT

## 2024-05-20 LAB — GLUCOSE 1H P 50 G GLC PO SERPL-MCNC: 90 MG/DL

## 2024-05-21 DIAGNOSIS — O99.019 ANEMIA COMPLICATING PREGNANCY, UNSPECIFIED TRIMESTER: ICD-10-CM

## 2024-05-21 LAB
BASOPHILS # BLD AUTO: 0.02 K/UL
BASOPHILS NFR BLD AUTO: 0.2 %
EOSINOPHIL # BLD AUTO: 0.05 K/UL
EOSINOPHIL NFR BLD AUTO: 0.6 %
HCT VFR BLD CALC: 34.2 %
HGB BLD-MCNC: 10.4 G/DL
IMM GRANULOCYTES NFR BLD AUTO: 0.6 %
LYMPHOCYTES # BLD AUTO: 1.86 K/UL
LYMPHOCYTES NFR BLD AUTO: 20.9 %
MAN DIFF?: NORMAL
MCHC RBC-ENTMCNC: 27.7 PG
MCHC RBC-ENTMCNC: 30.4 GM/DL
MCV RBC AUTO: 91 FL
MONOCYTES # BLD AUTO: 0.56 K/UL
MONOCYTES NFR BLD AUTO: 6.3 %
NEUTROPHILS # BLD AUTO: 6.38 K/UL
NEUTROPHILS NFR BLD AUTO: 71.4 %
PLATELET # BLD AUTO: 228 K/UL
RBC # BLD: 3.76 M/UL
RBC # FLD: 15.2 %
WBC # FLD AUTO: 8.92 K/UL

## 2024-05-21 RX ORDER — FERRIC MALTOL 30 MG/1
30 CAPSULE ORAL
Qty: 3 | Refills: 0 | Status: ACTIVE | COMMUNITY
Start: 2024-05-21 | End: 1900-01-01

## 2024-06-17 ENCOUNTER — APPOINTMENT (OUTPATIENT)
Dept: OBGYN | Facility: CLINIC | Age: 29
End: 2024-06-17
Payer: MEDICAID

## 2024-06-17 VITALS — SYSTOLIC BLOOD PRESSURE: 103 MMHG | WEIGHT: 175 LBS | DIASTOLIC BLOOD PRESSURE: 68 MMHG | BODY MASS INDEX: 27.41 KG/M2

## 2024-06-17 PROCEDURE — 0502F SUBSEQUENT PRENATAL CARE: CPT

## 2024-06-17 PROCEDURE — 90715 TDAP VACCINE 7 YRS/> IM: CPT

## 2024-06-17 PROCEDURE — 90471 IMMUNIZATION ADMIN: CPT

## 2024-06-21 ENCOUNTER — OUTPATIENT (OUTPATIENT)
Dept: OUTPATIENT SERVICES | Facility: HOSPITAL | Age: 29
LOS: 1 days | Discharge: HOME | End: 2024-06-21
Payer: MEDICAID

## 2024-06-21 ENCOUNTER — APPOINTMENT (OUTPATIENT)
Dept: ANTEPARTUM | Facility: CLINIC | Age: 29
End: 2024-06-21
Payer: MEDICAID

## 2024-06-21 ENCOUNTER — ASOB RESULT (OUTPATIENT)
Age: 29
End: 2024-06-21

## 2024-06-21 VITALS
HEART RATE: 84 BPM | SYSTOLIC BLOOD PRESSURE: 90 MMHG | RESPIRATION RATE: 16 BRPM | TEMPERATURE: 98 F | DIASTOLIC BLOOD PRESSURE: 54 MMHG

## 2024-06-21 VITALS — DIASTOLIC BLOOD PRESSURE: 55 MMHG | HEART RATE: 80 BPM | SYSTOLIC BLOOD PRESSURE: 98 MMHG

## 2024-06-21 DIAGNOSIS — O26.899 OTHER SPECIFIED PREGNANCY RELATED CONDITIONS, UNSPECIFIED TRIMESTER: ICD-10-CM

## 2024-06-21 LAB
APPEARANCE UR: CLEAR — SIGNIFICANT CHANGE UP
APTT BLD: 26.7 SEC — SIGNIFICANT CHANGE UP (ref 24.5–35.6)
BACTERIA # UR AUTO: ABNORMAL /HPF
BASOPHILS # BLD AUTO: 0.04 K/UL — SIGNIFICANT CHANGE UP (ref 0–0.2)
BASOPHILS NFR BLD AUTO: 0.4 % — SIGNIFICANT CHANGE UP (ref 0–2)
BILIRUB UR-MCNC: NEGATIVE — SIGNIFICANT CHANGE UP
CAST: 1 /LPF — SIGNIFICANT CHANGE UP (ref 0–4)
COLOR SPEC: YELLOW — SIGNIFICANT CHANGE UP
DIFF PNL FLD: NEGATIVE — SIGNIFICANT CHANGE UP
EOSINOPHIL # BLD AUTO: 0.09 K/UL — SIGNIFICANT CHANGE UP (ref 0–0.5)
EOSINOPHIL NFR BLD AUTO: 1 % — SIGNIFICANT CHANGE UP (ref 0–6)
GLUCOSE UR QL: NEGATIVE MG/DL — SIGNIFICANT CHANGE UP
HCT VFR BLD CALC: 30.4 % — LOW (ref 34.5–45)
HGB BLD-MCNC: 9.9 G/DL — LOW (ref 11.5–15.5)
IANC: 6.26 K/UL — SIGNIFICANT CHANGE UP (ref 1.8–7.4)
IMM GRANULOCYTES NFR BLD AUTO: 0.8 % — SIGNIFICANT CHANGE UP (ref 0–0.9)
INR BLD: 0.95 RATIO — SIGNIFICANT CHANGE UP (ref 0.85–1.18)
KETONES UR-MCNC: ABNORMAL MG/DL
LEUKOCYTE ESTERASE UR-ACNC: ABNORMAL
LYMPHOCYTES # BLD AUTO: 1.92 K/UL — SIGNIFICANT CHANGE UP (ref 1–3.3)
LYMPHOCYTES # BLD AUTO: 21.4 % — SIGNIFICANT CHANGE UP (ref 13–44)
MCHC RBC-ENTMCNC: 26.4 PG — LOW (ref 27–34)
MCHC RBC-ENTMCNC: 32.6 GM/DL — SIGNIFICANT CHANGE UP (ref 32–36)
MCV RBC AUTO: 81.1 FL — SIGNIFICANT CHANGE UP (ref 80–100)
MONOCYTES # BLD AUTO: 0.58 K/UL — SIGNIFICANT CHANGE UP (ref 0–0.9)
MONOCYTES NFR BLD AUTO: 6.5 % — SIGNIFICANT CHANGE UP (ref 2–14)
NEUTROPHILS # BLD AUTO: 6.26 K/UL — SIGNIFICANT CHANGE UP (ref 1.8–7.4)
NEUTROPHILS NFR BLD AUTO: 69.9 % — SIGNIFICANT CHANGE UP (ref 43–77)
NITRITE UR-MCNC: NEGATIVE — SIGNIFICANT CHANGE UP
NRBC # BLD: 0 /100 WBCS — SIGNIFICANT CHANGE UP (ref 0–0)
NRBC # FLD: 0 K/UL — SIGNIFICANT CHANGE UP (ref 0–0)
PH UR: 7 — SIGNIFICANT CHANGE UP (ref 5–8)
PLATELET # BLD AUTO: 241 K/UL — SIGNIFICANT CHANGE UP (ref 150–400)
PROT UR-MCNC: NEGATIVE MG/DL — SIGNIFICANT CHANGE UP
PROTHROM AB SERPL-ACNC: 10.8 SEC — SIGNIFICANT CHANGE UP (ref 9.5–13)
RBC # BLD: 3.75 M/UL — LOW (ref 3.8–5.2)
RBC # FLD: 14.3 % — SIGNIFICANT CHANGE UP (ref 10.3–14.5)
RBC CASTS # UR COMP ASSIST: 2 /HPF — SIGNIFICANT CHANGE UP (ref 0–4)
SP GR SPEC: 1.01 — SIGNIFICANT CHANGE UP (ref 1–1.03)
SQUAMOUS # UR AUTO: 6 /HPF — HIGH (ref 0–5)
UROBILINOGEN FLD QL: 0.2 MG/DL — SIGNIFICANT CHANGE UP (ref 0.2–1)
WBC # BLD: 8.96 K/UL — SIGNIFICANT CHANGE UP (ref 3.8–10.5)
WBC # FLD AUTO: 8.96 K/UL — SIGNIFICANT CHANGE UP (ref 3.8–10.5)
WBC UR QL: 7 /HPF — HIGH (ref 0–5)

## 2024-06-21 PROCEDURE — 59025 FETAL NON-STRESS TEST: CPT | Mod: 26

## 2024-06-21 PROCEDURE — 76815 OB US LIMITED FETUS(S): CPT | Mod: 26

## 2024-06-21 PROCEDURE — 76819 FETAL BIOPHYS PROFIL W/O NST: CPT | Mod: 26

## 2024-06-21 PROCEDURE — 76817 TRANSVAGINAL US OBSTETRIC: CPT | Mod: 26

## 2024-06-21 PROCEDURE — 99221 1ST HOSP IP/OBS SF/LOW 40: CPT | Mod: 25

## 2024-06-24 ENCOUNTER — ASOB RESULT (OUTPATIENT)
Age: 29
End: 2024-06-24

## 2024-06-24 ENCOUNTER — APPOINTMENT (OUTPATIENT)
Dept: ANTEPARTUM | Facility: CLINIC | Age: 29
End: 2024-06-24
Payer: MEDICAID

## 2024-06-24 ENCOUNTER — APPOINTMENT (OUTPATIENT)
Dept: OBGYN | Facility: CLINIC | Age: 29
End: 2024-06-24
Payer: MEDICAID

## 2024-06-24 VITALS
HEIGHT: 67 IN | WEIGHT: 176 LBS | DIASTOLIC BLOOD PRESSURE: 71 MMHG | SYSTOLIC BLOOD PRESSURE: 109 MMHG | BODY MASS INDEX: 27.62 KG/M2

## 2024-06-24 DIAGNOSIS — Z3A.29 29 WEEKS GESTATION OF PREGNANCY: ICD-10-CM

## 2024-06-24 DIAGNOSIS — O99.013 ANEMIA COMPLICATING PREGNANCY, THIRD TRIMESTER: ICD-10-CM
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
## 2024-06-24 DIAGNOSIS — O47.03 FALSE LABOR BEFORE 37 COMPLETED WEEKS OF GESTATION, THIRD TRIMESTER: ICD-10-CM

## 2024-06-24 DIAGNOSIS — D64.9 ANEMIA, UNSPECIFIED: ICD-10-CM
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
## 2024-06-24 PROCEDURE — 76816 OB US FOLLOW-UP PER FETUS: CPT

## 2024-06-24 PROCEDURE — 76819 FETAL BIOPHYS PROFIL W/O NST: CPT | Mod: 59

## 2024-06-24 PROCEDURE — 0502F SUBSEQUENT PRENATAL CARE: CPT

## 2024-07-08 ENCOUNTER — APPOINTMENT (OUTPATIENT)
Dept: ANTEPARTUM | Facility: CLINIC | Age: 29
End: 2024-07-08
Payer: MEDICAID

## 2024-07-08 ENCOUNTER — INPATIENT (INPATIENT)
Facility: HOSPITAL | Age: 29
LOS: 0 days | Discharge: HOME | End: 2024-07-09
Attending: OBSTETRICS & GYNECOLOGY | Admitting: OBSTETRICS & GYNECOLOGY

## 2024-07-08 ENCOUNTER — APPOINTMENT (OUTPATIENT)
Dept: OBGYN | Facility: CLINIC | Age: 29
End: 2024-07-08
Payer: MEDICAID

## 2024-07-08 ENCOUNTER — TRANSCRIPTION ENCOUNTER (OUTPATIENT)
Age: 29
End: 2024-07-08

## 2024-07-08 ENCOUNTER — ASOB RESULT (OUTPATIENT)
Age: 29
End: 2024-07-08

## 2024-07-08 VITALS
DIASTOLIC BLOOD PRESSURE: 61 MMHG | RESPIRATION RATE: 19 BRPM | HEIGHT: 67 IN | WEIGHT: 177.91 LBS | TEMPERATURE: 98 F | SYSTOLIC BLOOD PRESSURE: 97 MMHG | OXYGEN SATURATION: 98 % | HEART RATE: 78 BPM

## 2024-07-08 VITALS
SYSTOLIC BLOOD PRESSURE: 97 MMHG | HEIGHT: 67 IN | BODY MASS INDEX: 28.09 KG/M2 | WEIGHT: 179 LBS | DIASTOLIC BLOOD PRESSURE: 64 MMHG

## 2024-07-08 VITALS — TEMPERATURE: 98 F | RESPIRATION RATE: 16 BRPM
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
## 2024-07-08 DIAGNOSIS — O26.899 OTHER SPECIFIED PREGNANCY RELATED CONDITIONS, UNSPECIFIED TRIMESTER: ICD-10-CM

## 2024-07-08 LAB
ALBUMIN SERPL ELPH-MCNC: 3.3 G/DL — SIGNIFICANT CHANGE UP (ref 3.3–5)
ALP SERPL-CCNC: 87 U/L — SIGNIFICANT CHANGE UP (ref 40–120)
ALT FLD-CCNC: 10 U/L — SIGNIFICANT CHANGE UP (ref 4–33)
ANION GAP SERPL CALC-SCNC: 11 MMOL/L — SIGNIFICANT CHANGE UP (ref 7–14)
APPEARANCE UR: CLEAR — SIGNIFICANT CHANGE UP
AST SERPL-CCNC: 16 U/L — SIGNIFICANT CHANGE UP (ref 4–32)
BACTERIA # UR AUTO: ABNORMAL /HPF
BASOPHILS # BLD AUTO: 0.03 K/UL — SIGNIFICANT CHANGE UP (ref 0–0.2)
BASOPHILS NFR BLD AUTO: 0.3 % — SIGNIFICANT CHANGE UP (ref 0–2)
BILIRUB SERPL-MCNC: 0.2 MG/DL — SIGNIFICANT CHANGE UP (ref 0.2–1.2)
BILIRUB UR-MCNC: NEGATIVE — SIGNIFICANT CHANGE UP
BUN SERPL-MCNC: 5 MG/DL — LOW (ref 7–23)
CALCIUM SERPL-MCNC: 8.7 MG/DL — SIGNIFICANT CHANGE UP (ref 8.4–10.5)
CAST: 0 /LPF — SIGNIFICANT CHANGE UP (ref 0–4)
CHLORIDE SERPL-SCNC: 107 MMOL/L — SIGNIFICANT CHANGE UP (ref 98–107)
CO2 SERPL-SCNC: 20 MMOL/L — LOW (ref 22–31)
COLOR SPEC: YELLOW — SIGNIFICANT CHANGE UP
CREAT SERPL-MCNC: 0.48 MG/DL — LOW (ref 0.5–1.3)
DIFF PNL FLD: NEGATIVE — SIGNIFICANT CHANGE UP
EGFR: 132 ML/MIN/1.73M2 — SIGNIFICANT CHANGE UP
EOSINOPHIL # BLD AUTO: 0.11 K/UL — SIGNIFICANT CHANGE UP (ref 0–0.5)
EOSINOPHIL NFR BLD AUTO: 1.1 % — SIGNIFICANT CHANGE UP (ref 0–6)
FIBRINOGEN PPP-MCNC: 428 MG/DL — SIGNIFICANT CHANGE UP (ref 200–465)
GLUCOSE SERPL-MCNC: 77 MG/DL — SIGNIFICANT CHANGE UP (ref 70–99)
GLUCOSE UR QL: NEGATIVE MG/DL — SIGNIFICANT CHANGE UP
HCT VFR BLD CALC: 31.9 % — LOW (ref 34.5–45)
HGB BLD-MCNC: 9.9 G/DL — LOW (ref 11.5–15.5)
IANC: 6.86 K/UL — SIGNIFICANT CHANGE UP (ref 1.8–7.4)
IMM GRANULOCYTES NFR BLD AUTO: 1.5 % — HIGH (ref 0–0.9)
KETONES UR-MCNC: NEGATIVE MG/DL — SIGNIFICANT CHANGE UP
LEUKOCYTE ESTERASE UR-ACNC: ABNORMAL
LYMPHOCYTES # BLD AUTO: 2.29 K/UL — SIGNIFICANT CHANGE UP (ref 1–3.3)
LYMPHOCYTES # BLD AUTO: 22.4 % — SIGNIFICANT CHANGE UP (ref 13–44)
MCHC RBC-ENTMCNC: 25.7 PG — LOW (ref 27–34)
MCHC RBC-ENTMCNC: 31 GM/DL — LOW (ref 32–36)
MCV RBC AUTO: 82.9 FL — SIGNIFICANT CHANGE UP (ref 80–100)
MONOCYTES # BLD AUTO: 0.78 K/UL — SIGNIFICANT CHANGE UP (ref 0–0.9)
MONOCYTES NFR BLD AUTO: 7.6 % — SIGNIFICANT CHANGE UP (ref 2–14)
NEUTROPHILS # BLD AUTO: 6.86 K/UL — SIGNIFICANT CHANGE UP (ref 1.8–7.4)
NEUTROPHILS NFR BLD AUTO: 67.1 % — SIGNIFICANT CHANGE UP (ref 43–77)
NITRITE UR-MCNC: NEGATIVE — SIGNIFICANT CHANGE UP
NRBC # BLD: 0 /100 WBCS — SIGNIFICANT CHANGE UP (ref 0–0)
NRBC # FLD: 0 K/UL — SIGNIFICANT CHANGE UP (ref 0–0)
PH UR: 6.5 — SIGNIFICANT CHANGE UP (ref 5–8)
PLATELET # BLD AUTO: 214 K/UL — SIGNIFICANT CHANGE UP (ref 150–400)
POTASSIUM SERPL-MCNC: 3.8 MMOL/L — SIGNIFICANT CHANGE UP (ref 3.5–5.3)
POTASSIUM SERPL-SCNC: 3.8 MMOL/L — SIGNIFICANT CHANGE UP (ref 3.5–5.3)
PROT SERPL-MCNC: 6.4 G/DL — SIGNIFICANT CHANGE UP (ref 6–8.3)
PROT UR-MCNC: NEGATIVE MG/DL — SIGNIFICANT CHANGE UP
RBC # BLD: 3.85 M/UL — SIGNIFICANT CHANGE UP (ref 3.8–5.2)
RBC # FLD: 14.8 % — HIGH (ref 10.3–14.5)
RBC CASTS # UR COMP ASSIST: 0 /HPF — SIGNIFICANT CHANGE UP (ref 0–4)
REVIEW: SIGNIFICANT CHANGE UP
SODIUM SERPL-SCNC: 138 MMOL/L — SIGNIFICANT CHANGE UP (ref 135–145)
SP GR SPEC: 1 — SIGNIFICANT CHANGE UP (ref 1–1.03)
SQUAMOUS # UR AUTO: 6 /HPF — HIGH (ref 0–5)
UROBILINOGEN FLD QL: 0.2 MG/DL — SIGNIFICANT CHANGE UP (ref 0.2–1)
WBC # BLD: 10.22 K/UL — SIGNIFICANT CHANGE UP (ref 3.8–10.5)
WBC # FLD AUTO: 10.22 K/UL — SIGNIFICANT CHANGE UP (ref 3.8–10.5)
WBC UR QL: 17 /HPF — HIGH (ref 0–5)

## 2024-07-08 PROCEDURE — 76817 TRANSVAGINAL US OBSTETRIC: CPT | Mod: 26

## 2024-07-08 PROCEDURE — 0502F SUBSEQUENT PRENATAL CARE: CPT

## 2024-07-08 PROCEDURE — 76819 FETAL BIOPHYS PROFIL W/O NST: CPT | Mod: 26

## 2024-07-08 RX ORDER — DEXTROSE MONOHYDRATE AND SODIUM CHLORIDE 5; .3 G/100ML; G/100ML
1000 INJECTION, SOLUTION INTRAVENOUS ONCE
Refills: 0 | Status: COMPLETED | OUTPATIENT
Start: 2024-07-08 | End: 2024-07-08

## 2024-07-08 RX ADMIN — DEXTROSE MONOHYDRATE AND SODIUM CHLORIDE 1000 MILLILITER(S): 5; .3 INJECTION, SOLUTION INTRAVENOUS at 19:48

## 2024-07-08 NOTE — OB PROVIDER TRIAGE NOTE - NSHPPHYSICALEXAM_GEN_ALL_CORE
Vital Signs Last 24 Hrs  T(C): 36.6 (08 Jul 2024 18:07), Max: 36.6 (08 Jul 2024 18:04)  T(F): 97.9 (08 Jul 2024 18:07), Max: 97.9 (08 Jul 2024 18:07)  HR: 95 (08 Jul 2024 18:07) (90 - 95)  BP: 97/60 (08 Jul 2024 18:07) (97/60 - 97/60)  BP(mean): --  RR: 16 (08 Jul 2024 18:07) (16 - 16)  SpO2: --    Parameters below as of 08 Jul 2024 18:07  Patient On (Oxygen Delivery Method): room air    gen: a/o x 3, NAD. appears comfortable   pulm: breathing unlabored on room air  abd: soft and nontender, gravid  SSE: cervix appears closed. no LOF/VB noted. Ffn/GBS performed/held.  TVS: CL 2.5-2.8cm with small funneling, no dynamic changes noted.  TAS: vertex by sono. anterior placenta. m wave 149. MEAGHAN 14.6. BPP 8/8. image saved in ASOB  EFM: baseline 135 with mod variability, pos accels  TOCO: irregular contractions, pt denies feeling contractions Vital Signs Last 24 Hrs  T(C): 36.6 (08 Jul 2024 18:07), Max: 36.6 (08 Jul 2024 18:04)  T(F): 97.9 (08 Jul 2024 18:07), Max: 97.9 (08 Jul 2024 18:07)  HR: 95 (08 Jul 2024 18:07) (90 - 95)  BP: 97/60 (08 Jul 2024 18:07) (97/60 - 97/60)  BP(mean): --  RR: 16 (08 Jul 2024 18:07) (16 - 16)  SpO2: --    Parameters below as of 08 Jul 2024 18:07  Patient On (Oxygen Delivery Method): room air    gen: a/o x 3, NAD. appears comfortable   pulm: breathing unlabored on room air  abd: soft and nontender, gravid  neg CVA tenderness  SSE: cervix appears closed. no LOF/VB noted. Ffn/GBS performed/held.  TVS: CL 2.5-2.8cm with small funneling, no dynamic changes noted.  TAS: vertex by sono. anterior placenta. m wave 149. MEAGHAN 14.6. BPP 8/8. image saved in ASOB  EFM: baseline 135 with mod variability, pos accels  TOCO: irregular contractions, pt denies feeling contractions Vital Signs Last 24 Hrs  T(C): 36.6 (08 Jul 2024 18:07), Max: 36.6 (08 Jul 2024 18:04)  T(F): 97.9 (08 Jul 2024 18:07), Max: 97.9 (08 Jul 2024 18:07)  HR: 95 (08 Jul 2024 18:07) (90 - 95)  BP: 97/60 (08 Jul 2024 18:07) (97/60 - 97/60)  BP(mean): --  RR: 16 (08 Jul 2024 18:07) (16 - 16)  SpO2: --    Parameters below as of 08 Jul 2024 18:07  Patient On (Oxygen Delivery Method): room air    gen: a/o x 3, NAD. appears comfortable   pulm: breathing unlabored on room air  abd: soft and nontender, gravid  neg CVA tenderness  SSE: cervix appears closed. no LOF/VB noted. Ffn/GBS performed/held.  TVS: CL 2.5-2.8cm with small funneling, no dynamic changes noted.  SVE: 0.5/30/-3, no VB/LOF noted   TAS: vertex by sono. anterior placenta. m wave 149. MEAGHAN 14.6. BPP 8/8. image saved in ASOB  EFM: baseline 135 with mod variability, pos accels  TOCO: irregular contractions, pt denies feeling contractions

## 2024-07-08 NOTE — OB PROVIDER H&P - NSLOWPPHRISK_OBGYN_A_OB
No previous uterine incision/Schultz Pregnancy/Less than or equal to 4 previous vaginal births/No known bleeding disorder/No history of postpartum hemorrhage

## 2024-07-08 NOTE — OB PROVIDER H&P - NSHPPHYSICALEXAM_GEN_ALL_CORE
Vital Signs Last 24 Hrs  T(C): 36.6 (08 Jul 2024 18:07), Max: 36.6 (08 Jul 2024 18:04)  T(F): 97.9 (08 Jul 2024 18:07), Max: 97.9 (08 Jul 2024 18:07)  HR: 95 (08 Jul 2024 18:07) (90 - 95)  BP: 97/60 (08 Jul 2024 18:07) (97/60 - 97/60)  BP(mean): --  RR: 16 (08 Jul 2024 18:07) (16 - 16)  SpO2: --    Parameters below as of 08 Jul 2024 18:07  Patient On (Oxygen Delivery Method): room air    gen: a/o x 3, NAD. appears comfortable   pulm: breathing unlabored on room air  abd: soft and nontender, gravid  neg CVA tenderness  SSE: cervix appears closed. no LOF/VB noted. Ffn/GBS performed/held.  TVS: CL 2.5-2.8cm with small funneling, no dynamic changes noted.  SVE: 0.5/30/-3, no VB/LOF noted   TAS: vertex by sono. anterior placenta. m wave 149. MEAGHAN 14.6. BPP 8/8. image saved in ASOB  EFM: baseline 135 with mod variability, pos accels  TOCO: irregular contractions, pt denies feeling contractions

## 2024-07-08 NOTE — DISCHARGE NOTE ANTEPARTUM - PLAN OF CARE
Follow up with your doctor within the week. If you develop painful contractions, decreased fetal movement, vaginal bleeding or leakage of fluid like you broke you water, return to labor and delivery.

## 2024-07-08 NOTE — OB PROVIDER TRIAGE NOTE - NSHPLABSRESULTS_GEN_ALL_CORE
Urinalysis Basic - ( 2024 18:50 )    Color: Yellow / Appearance: Clear / S.005 / pH: x  Gluc: x / Ketone: Negative mg/dL  / Bili: Negative / Urobili: 0.2 mg/dL   Blood: x / Protein: Negative mg/dL / Nitrite: Negative   Leuk Esterase: Moderate / RBC: 0 /HPF / WBC 17 /HPF   Sq Epi: x / Non Sq Epi: 6 /HPF / Bacteria: Moderate /HPF    CBC Full  -  ( 2024 18:12 )  WBC Count : 10.22 K/uL  RBC Count : 3.85 M/uL  Hemoglobin : 9.9 g/dL  Hematocrit : 31.9 %  Platelet Count - Automated : 214 K/uL  Mean Cell Volume : 82.9 fL  Mean Cell Hemoglobin : 25.7 pg  Mean Cell Hemoglobin Concentration : 31.0 gm/dL  Auto Neutrophil # : 6.86 K/uL  Auto Lymphocyte # : 2.29 K/uL  Auto Monocyte # : 0.78 K/uL  Auto Eosinophil # : 0.11 K/uL  Auto Basophil # : 0.03 K/uL  Auto Neutrophil % : 67.1 %  Auto Lymphocyte % : 22.4 %  Auto Monocyte % : 7.6 %  Auto Eosinophil % : 1.1 %  Auto Basophil % : 0.3 %

## 2024-07-08 NOTE — DISCHARGE NOTE ANTEPARTUM - CARE PLAN
1 Principal Discharge DX:	Pregnant state, incidental  Assessment and plan of treatment:	Follow up with your doctor within the week. If you develop painful contractions, decreased fetal movement, vaginal bleeding or leakage of fluid like you broke you water, return to labor and delivery.

## 2024-07-08 NOTE — DISCHARGE NOTE ANTEPARTUM - PATIENT PORTAL LINK FT
You can access the FollowMyHealth Patient Portal offered by [facility] by registering at the following website: [email or web address]. By joining [facility]’s FollowMyHealth portal, you will also be able to view your health information using other applications (apps) compatible with our system.

## 2024-07-08 NOTE — OB PROVIDER TRIAGE NOTE - NSOBPROVIDERNOTE_OBGYN_ALL_OB_FT
28 year old female  @ 32.1GA with SLIUP, uncomplicated PNC, presents to triage s/p fall.     NST reactive, irregualr contractions  BPP 8/8  Fibrinogen 428, CBC platelets 214  UA mod leuks, contaminated 28 year old female  @ 32.1GA with SLIUP, uncomplicated PNC, presents to triage s/p fall.     NST reactive, irregualr contractions  BPP 8/8  Fibrinogen 428, CBC platelets 214  UA mod leuks, contaminated - ordered urine culture 28 year old female  @ 32.1GA with SLIUP, uncomplicated PNC, presents to triage s/p fall.     NST reactive, irregular contractions - pt denies feeling contractions/cramping  BPP 8/8, MEAGHAN 14  cervix appears closed, measures 2.5-2.8cm with small funneling   Fibrinogen 428, CBC platelets 214  UA mod leuks, contaminated - ordered urine culture    d/s Dr Burton at [facility], continue to monitor pt for complete 4 hours from start of NST - until 10pm 28 year old female  @ 32.1GA with SLIUP, uncomplicated PNC, presents to triage s/p fall.     NST reactive, irregular contractions - pt denies feeling contractions/cramping  BPP 8/8, MEAGHAN 14  cervix appears closed, measures 2.5-2.8cm with small funneling   Fibrinogen 428, CBC platelets 214  UA mod leuks, contaminated - ordered urine culture  LR bolus    d/s Dr Burton at [facility], continue to monitor pt for complete 4 hours from start of NST - until 10pm 28 year old female  @ 32.1GA with SLIUP, uncomplicated PNC, presents to triage s/p fall.     NST reactive, irregular contractions - pt denies feeling contractions/cramping  BPP 8/8, MEAGHAN 14  SVE 0.5/30/-3, cervix measures 2.5-2.8cm with small funneling   Fibrinogen 428, CBC platelets 214  UA mod leuks, contaminated - ordered urine culture  LR bolus    d/w Dr Burton at [facility], continue to monitor pt for complete 4 hours from start of NST - until 10pm  pt starting to contract more frequently, > 6 contractions in 1 hour  @ 21:55 d/w Dr Burton: admit to antepartum for monitoring s/p fall   - continuos monitoring to rule out PTL/placental abruption   - second LR bolus ordered   ordered/sent admission labs  discussed consents with pt, pt verbalized understanding and signed  GBS sent

## 2024-07-08 NOTE — DISCHARGE NOTE ANTEPARTUM - NS OB DC IMMUNIZATIONS MMR YN
Patient refusing to sit in bed for blood draw. Made aware that the commode was not a safe place to sit or perform procedures. Charge nurse made aware. Will order hospital bed as patient states she cannot sit on ED bed. No

## 2024-07-08 NOTE — DISCHARGE NOTE ANTEPARTUM - CARE PROVIDER_API CALL
Joseph Moraes  Maternal/Fetal Medicine  [address], [address]  [place] [address]  Phone: [phone]  Fax: [phone]  Follow Up Time: 1 week

## 2024-07-08 NOTE — OB PROVIDER H&P - ASSESSMENT
28 year old female  @ 32.1GA with SLIUP, uncomplicated PNC, presents to triage s/p fall without direct abdominal trauma.     NST reactive, irregular contractions - pt denies feeling contractions/cramping  BPP 8/8, MEAGHAN 14  SVE 0.5/30/-3, cervix measures 2.5-2.8cm with small funneling   Fibrinogen 428, CBC platelets 214  UA mod leuks, contaminated - ordered urine culture  LR bolus    d/w Dr Burton at [facility], continue to monitor pt for complete 4 hours from start of NST - until 10pm    pt starting to contract more frequently, > 6 contractions in 1 hour  @ 21:55 d/w Dr Burton: admit to antepartum for monitoring s/p fall and >6 contractions in an hour    - continuos monitoring to rule out PTL/placental abruption   - second LR bolus ordered   ordered/sent admission labs  discussed consents with pt, pt verbalized understanding and signed  GBS sent   28 year old female  @ 32.1GA with SLIUP, uncomplicated PNC, presents to triage s/p fall without direct abdominal trauma.     NST reactive, irregular contractions - pt denies feeling contractions/cramping  BPP 8/8, MEAGHAN 14  SVE 0.5/30/-3, cervix measures 2.5-2.8cm with small funneling   SSE no bleeding noted   Fibrinogen 428, CBC platelets 214  UA mod leuks, contaminated - ordered urine culture  LR bolus    d/w Dr Burton at [facility], continue to monitor pt for complete 4 hours from start of NST - until 10pm    pt starting to contract more frequently, > 6 contractions in 1 hour  @ 21:55 d/w Dr Burton: admit to antepartum for monitoring s/p fall and >6 contractions in an hour    - continuos monitoring to rule out PTL/placental abruption   - second LR bolus ordered   ordered/sent admission labs  discussed consents with pt, pt verbalized understanding and signed  Risks, benefits, alternatives, and possible complications have been discussed in detail with the patient in her native language. Pre-admission, admission, and post admission procedures and expectations were discussed in detail. All questions answered, all appropriate hospital consents were signed. Anticipate normal vaginal delivery.  Informed consents were obtained. The following were discussed:   - induction/augmentation of labor - use of medications and or cook balloon to begin or enhance labor.   - obstetrical  management including internal fetal/contraction monitoring   - normal vaginal delivery  - possible  section  GBS sent      d/w Dr Victorina Velasquez PGY 4 28 year old female  @ 32.1GA with SLIUP, uncomplicated PNC, presents to triage s/p fall without direct abdominal trauma.     NST reactive, irregular contractions - pt denies feeling contractions/cramping  BPP 8/8, MEAGHAN 14  SVE 0.5/30/-3, cervix measures 2.5-2.8cm with small funneling   SSE no bleeding noted   Fibrinogen 428, CBC platelets 214  UA mod leuks, contaminated - ordered urine culture  LR bolus    d/w Dr Burton at [facility], continue to monitor pt for complete 4 hours from start of NST - until 10pm    pt starting to contract more frequently, > 6 contractions in 1 hour  @ 21:55 d/w Dr Burton: admit to antepartum for monitoring s/p fall and >6 contractions in an hour    - continuos monitoring to rule out PTL/placental abruption   - second LR bolus ordered   ordered/sent admission labs  discussed consents with pt, pt verbalized understanding and signed  Risks, benefits, alternatives, and possible complications have been discussed in detail with the patient in her native language. Pre-admission, admission, and post admission procedures and expectations were discussed in detail. All questions answered, all appropriate hospital consents were signed. Anticipate normal vaginal delivery.  Informed consents were obtained. The following were discussed:   - induction/augmentation of labor - use of medications and or cook balloon to begin or enhance labor.   - obstetrical  management including internal fetal/contraction monitoring   - normal vaginal delivery  - possible  section  GBS sent  d/w Dr Victorina Velasquez PGY 4    OB Attending On Call  27 y/o  EGA 32+ weeks s/p fall without direct ab trauma. Pt was being observed in triage but started to contract >6x/hour and decision was made to admit pt for observation.  Pt with possible PTL/Abruption  CTX are mild and pt does not feel them; no bleeding    VSS, afebrile  Cx - FT  FHT - reactive    Will:    1) Admit to L and D for observation  2) IV hydration    Will follow    JAMES Burton

## 2024-07-08 NOTE — DISCHARGE NOTE ANTEPARTUM - MEDICATION SUMMARY - MEDICATIONS TO STOP TAKING
If you are a smoker, it is important for your health to stop smoking. Please be aware that second hand smoke is also harmful.
I will STOP taking the medications listed below when I get home from the hospital:    ibuprofen 600 mg oral tablet  -- 1 tab(s) by mouth every 6 hours

## 2024-07-08 NOTE — DISCHARGE NOTE ANTEPARTUM - HOSPITAL COURSE
28 year old female  @ 32.1GA with SLIUP, uncomplicated PNC, presented to triage s/p fall without direct abdominal trauma. Pt ruled out for labor (VE 0.5//-3 with cervical length 2.5-2.8cm). Pt initially esperanza q4min, but then they spaced to >13min, pt not esperanza painfully. Pt expressed strong desire to leave hospital and was discharged with  labor precautions. During admission she never had any vaginal bleeding. Reactive NST.                         9.9    10.22 )-----------( 214      ( 2024 18:12 )             31.9    28 year old female  @ 32.1GA with SLIUP, uncomplicated PNC, presented to triage s/p fall without direct abdominal trauma. Pt ruled out for labor (VE 0.5/30/-3 with cervical length 2.5-2.8cm). Pt initially esperanza q4min, but then they spaced to >13min, pt not esperanza painfully. Pt expressed strong desire to leave hospital and was discharged with  labor precautions. During admission she never had any vaginal bleeding. Reactive NST.   Pt wants to leave and expressed she would sign out AMA if not discharged.  Pt not esperanza frequently anymore.  Discussed risks of PTL and abruption with pt who verbalized understanding.                         9.9    10.22 )-----------( 214      ( 2024 18:12 )             31.9

## 2024-07-08 NOTE — OB RN PATIENT PROFILE - FUNCTIONAL ASSESSMENT - DAILY ACTIVITY PT AGE POP HIDDEN
Subjective   Patient ID: Abdulkadir is a 44 year old female.    Chief Complaint   Patient presents with   • New Patient   • Medication Refill     Pt here for check up. Pt is 3 years post gastric sleeve. Pt states doing well gained about 15 lbs but stable. Pt only on hctz for bp. Pt still has chronic back pain off and on due to work injury many years ago. Pt had recent labs and colonoscopy at [facility] due to family hx of colon cancer. Pt states had 3 colonic polyps        Abdulkadir has a past medical history of Arthritis, Essential (primary) hypertension, Iron deficiency anemia secondary to inadequate dietary iron intake (4/1/2019), and Neuromuscular disorder (CMS/HCC). She also has no past medical history of Allergy, Anxiety, Blood transfusion without reported diagnosis, Cataract, Chronic kidney disease, Clotting disorder (CMS/HCC), Congestive cardiac failure (CMS/HCC), COPD (chronic obstructive pulmonary disease) (CMS/HCC), Depressive disorder, Diabetes mellitus (CMS/HCC), Emphysema of lung (CMS/HCC), GERD (gastroesophageal reflux disease), Glaucoma (increased eye pressure), Heart murmur, HIV disease (CMS/HCC), Malignant neoplasm (CMS/HCC), Meningitis, Myocardial infarction (CMS/HCC), Osteoporosis, RAD (reactive airway disease), Seizures (CMS/HCC), Sickle cell anemia (CMS/HCC), Stroke (CMS/HCC), Substance abuse (CMS/HCC), Thyroid disease, or Tuberculosis.  Abdulkadir has Adenomatous polyp of colon; Family history of colon cancer; Osteoarthritis of spine with radiculopathy, lumbar region; Iron deficiency anemia secondary to inadequate dietary iron intake; and S/P bariatric surgery on their problem list.  Abdulkadir has a past surgical history that includes Gastric bypass.  Her family history includes Cancer in her paternal grandfather, paternal grandmother, and sister; Diabetes in her brother, maternal grandmother, mother, and sister; Hypertension in her father and mother.  Abdulkadir reports that  has never smoked. she  has never used smokeless tobacco. She reports that she does not drink alcohol or use drugs.  Abdulkadir has a current medication list which includes the following prescription(s): hydrochlorothiazide, hydrocodone-acetaminophen, and hydrocodone-acetaminophen.  Abdulkadir is allergic to amoxicillin; metoprolol succinate er; and penicillins.    Review of Systems   Constitutional: Negative for chills, fatigue and fever.   HENT: Negative for congestion, postnasal drip, rhinorrhea, sinus pressure, sinus pain and sore throat.    Respiratory: Negative for cough, shortness of breath and wheezing.    Cardiovascular: Negative for chest pain, palpitations and leg swelling.   Gastrointestinal: Positive for constipation. Negative for abdominal pain, diarrhea, nausea and vomiting.   Genitourinary: Negative for difficulty urinating, dysuria, frequency, hematuria and urgency.   Musculoskeletal: Positive for back pain, joint swelling (hands) and neck pain. Negative for arthralgias and neck stiffness.   Neurological: Negative for dizziness, light-headedness, numbness and headaches.   All other systems reviewed and are negative.      Objective   Physical Exam   Constitutional: She is oriented to person, place, and time. She appears well-developed and well-nourished. No distress.   HENT:   Head: Normocephalic and atraumatic.   Right Ear: External ear normal.   Left Ear: External ear normal.   Nose: Nose normal.   Mouth/Throat: Oropharynx is clear and moist. No oropharyngeal exudate.   Eyes: Conjunctivae and EOM are normal. Pupils are equal, round, and reactive to light.   Neck: Normal range of motion. Neck supple. No JVD present.   Cardiovascular: Normal rate, regular rhythm and normal heart sounds.   No murmur heard.  Pulmonary/Chest: Effort normal and breath sounds normal. No respiratory distress. She has no wheezes.   Abdominal: Soft. Bowel sounds are normal. She exhibits no distension and no mass. There is no tenderness. There is no  rebound and no guarding. No hernia.   Musculoskeletal: Normal range of motion. She exhibits no edema or tenderness.   Lymphadenopathy:     She has no cervical adenopathy.   Neurological: She is alert and oriented to person, place, and time. She displays normal reflexes. No cranial nerve deficit.   Skin: Skin is warm and dry. No erythema.   Psychiatric: She has a normal mood and affect. Her behavior is normal. Judgment and thought content normal.   Nursing note and vitals reviewed.      Patient Active Problem List   Diagnosis   • Adenomatous polyp of colon   • Family history of colon cancer   • Osteoarthritis of spine with radiculopathy, lumbar region   • Iron deficiency anemia secondary to inadequate dietary iron intake   • S/P bariatric surgery       Assessment   Problem List Items Addressed This Visit        Musculoskeletal    Osteoarthritis of spine with radiculopathy, lumbar region     Cont norco prn sparingly            Hematologic & Lymphatic    Iron deficiency anemia secondary to inadequate dietary iron intake     Cont iron supplement           Other Visit Diagnoses     Essential hypertension    -  Primary    Relevant Medications    hydrochlorothiazide (HYDRODIURIL) 25 MG tablet          Schedule follow up: in 3 months   Adult

## 2024-07-08 NOTE — OB PROVIDER TRIAGE NOTE - HISTORY OF PRESENT ILLNESS
28 year old female  @ 32.1GA with SLIUP, uncomplicated PNC, presents to triage s/p fall. pt fell at 16:30 outside Dr office on cracked pavement. Pt says she fell on her left arm, denies abdominal trauma. Denies abdominal pain, contractions, cramps, LOC.  reports GFM. denies vaginal bleeding, leakage of fluid.   Denies fever, chills, headaches, changes in vision, chest pain, palpitations, shortness of breath, cough, nausea, vomiting, diarrhea, constipation, urinary symptoms, edema.   PNC with Dr Moraes   Pt presented to triage s/p fall in 2024 and discharged home

## 2024-07-08 NOTE — OB RN PATIENT PROFILE - FALL HARM RISK - HARM RISK INTERVENTIONS

## 2024-07-08 NOTE — DISCHARGE NOTE ANTEPARTUM - NS MD DC FALL RISK RISK
For information on Fall & Injury Prevention, visit: [email or web address] OR  [email or web address] OR  https://www.cdc.gov/steadi/patient.html

## 2024-07-08 NOTE — OB PROVIDER H&P - HISTORY OF PRESENT ILLNESS
28 year old female  @ 32.1GA with SLIUP, uncomplicated PNC, presents to triage s/p fall. pt fell at 16:30 outside Dr office on cracked pavement. Pt says she fell on her left arm, denies abdominal trauma. Denies abdominal pain, contractions, cramps, LOC.  reports GFM. denies vaginal bleeding, leakage of fluid.   Denies fever, chills, headaches, changes in vision, chest pain, palpitations, shortness of breath, cough, nausea, vomiting, diarrhea, constipation, urinary symptoms, edema.   PNC with Dr Moraes   Pt presented to triage s/p fall in 2024 and discharged home  [facility] michael : EFW 3#6, 37%  HIE B positive 24

## 2024-07-08 NOTE — OB RN PATIENT PROFILE - FUNCTIONAL ASSESSMENT - BASIC MOBILITY 4.
51 yo M with PMH HTN, DM, chest pain, presenting to ED for evaluation of chest pain x 2 hours. Pain started abruptly, and increased in intensity over 15-20 minutes. Chest pain described as squeezing in the left side chest below nipple line, worse with sitting up.  Patients wife called ambulance for transport to ED. Patient was given 324mg of aspirin in transport by EMS. Reports has not seen cardiologist in 5+ years, has not seen pcp in 2-3 years, and has stopped home medications for HTN and DM 2-3 years prior. Denies pain radiating to arms, jaw or back pain, calf pain, fevers, chills, HA, SOB, abdominal pain, n/v/d/c, numbness or weakness in the extremities, dysuria, hematuria. Denies recent new medications or changes in daily habits. Denies recent travel or sick contacts.    On exam, appears in NAD, speaking in clear and coherent sentences, A&Ox3. Breathing unlabored. Lungs clear and equal breath sounds bilaterally, heart RRR, abdomen soft and non tender to palpation. Mild left chest wall tenderness to palpation, with abdominal crunch, and with large deep breath.   EKG: NSR.   Vitals: /97, HR 84, afebrile at 98F, O2 97% on RA.  Main differentials include but are not limited to: acute cardiac pathology including MI, pulmonary embolism, musculoskeletal pain including costochondritis, versus less likely epigastric pain.     Will order: IV start, monitoring, Labs: CBC + diff, CMP, Trop T, A1C, d-dimer, CXR, ofirmev. Patient given aspirin in transport.     See progress notes for ongoing care.      CDU PA- Tele, Stress, tele doc of the day 4 = No assist / stand by assistance

## 2024-07-09 LAB
BLD GP AB SCN SERPL QL: NEGATIVE — SIGNIFICANT CHANGE UP
RH IG SCN BLD-IMP: POSITIVE — SIGNIFICANT CHANGE UP
T PALLIDUM AB TITR SER: NEGATIVE — SIGNIFICANT CHANGE UP

## 2024-07-10 LAB
CULTURE RESULTS: SIGNIFICANT CHANGE UP
SPECIMEN SOURCE: SIGNIFICANT CHANGE UP

## 2024-07-11 LAB
CULTURE RESULTS: SIGNIFICANT CHANGE UP
SPECIMEN SOURCE: SIGNIFICANT CHANGE UP

## 2024-07-22 ENCOUNTER — APPOINTMENT (OUTPATIENT)
Dept: OBGYN | Facility: CLINIC | Age: 29
End: 2024-07-22
Payer: MEDICAID

## 2024-07-22 VITALS
BODY MASS INDEX: 28.25 KG/M2 | HEIGHT: 67 IN | SYSTOLIC BLOOD PRESSURE: 107 MMHG | DIASTOLIC BLOOD PRESSURE: 70 MMHG | WEIGHT: 180 LBS

## 2024-07-22 PROCEDURE — 0502F SUBSEQUENT PRENATAL CARE: CPT

## 2024-08-07 ENCOUNTER — APPOINTMENT (OUTPATIENT)
Dept: OBGYN | Facility: CLINIC | Age: 29
End: 2024-08-07

## 2024-08-07 PROCEDURE — 0502F SUBSEQUENT PRENATAL CARE: CPT

## 2024-08-12 ENCOUNTER — NON-APPOINTMENT (OUTPATIENT)
Age: 29
End: 2024-08-12

## 2024-08-14 ENCOUNTER — APPOINTMENT (OUTPATIENT)
Dept: OBGYN | Facility: CLINIC | Age: 29
End: 2024-08-14
Payer: MEDICAID

## 2024-08-14 PROCEDURE — ZZZZZ: CPT

## 2024-08-21 ENCOUNTER — APPOINTMENT (OUTPATIENT)
Dept: ANTEPARTUM | Facility: CLINIC | Age: 29
End: 2024-08-21
Payer: MEDICAID

## 2024-08-21 ENCOUNTER — NON-APPOINTMENT (OUTPATIENT)
Age: 29
End: 2024-08-21

## 2024-08-21 ENCOUNTER — APPOINTMENT (OUTPATIENT)
Dept: OBGYN | Facility: CLINIC | Age: 29
End: 2024-08-21
Payer: MEDICAID

## 2024-08-21 ENCOUNTER — ASOB RESULT (OUTPATIENT)
Age: 29
End: 2024-08-21

## 2024-08-21 VITALS
HEIGHT: 67 IN | WEIGHT: 182 LBS | DIASTOLIC BLOOD PRESSURE: 71 MMHG | BODY MASS INDEX: 28.56 KG/M2 | SYSTOLIC BLOOD PRESSURE: 109 MMHG

## 2024-08-21 PROCEDURE — 76819 FETAL BIOPHYS PROFIL W/O NST: CPT | Mod: 59

## 2024-08-21 PROCEDURE — 0502F SUBSEQUENT PRENATAL CARE: CPT

## 2024-08-21 PROCEDURE — 76816 OB US FOLLOW-UP PER FETUS: CPT

## 2024-08-26 ENCOUNTER — APPOINTMENT (OUTPATIENT)
Dept: OBGYN | Facility: CLINIC | Age: 29
End: 2024-08-26
Payer: MEDICAID

## 2024-08-26 ENCOUNTER — APPOINTMENT (OUTPATIENT)
Dept: ANTEPARTUM | Facility: CLINIC | Age: 29
End: 2024-08-26

## 2024-08-26 ENCOUNTER — INPATIENT (INPATIENT)
Facility: HOSPITAL | Age: 29
LOS: 1 days | Discharge: HOME | End: 2024-08-28
Attending: OBSTETRICS & GYNECOLOGY | Admitting: OBSTETRICS & GYNECOLOGY
Payer: MEDICAID

## 2024-08-26 VITALS
TEMPERATURE: 98 F | DIASTOLIC BLOOD PRESSURE: 51 MMHG | HEART RATE: 90 BPM | RESPIRATION RATE: 16 BRPM | SYSTOLIC BLOOD PRESSURE: 101 MMHG

## 2024-08-26 VITALS — WEIGHT: 180 LBS | DIASTOLIC BLOOD PRESSURE: 77 MMHG | BODY MASS INDEX: 28.19 KG/M2 | SYSTOLIC BLOOD PRESSURE: 114 MMHG

## 2024-08-26 DIAGNOSIS — O26.899 OTHER SPECIFIED PREGNANCY RELATED CONDITIONS, UNSPECIFIED TRIMESTER: ICD-10-CM

## 2024-08-26 LAB
BASOPHILS # BLD AUTO: 0.03 K/UL — SIGNIFICANT CHANGE UP (ref 0–0.2)
BASOPHILS NFR BLD AUTO: 0.3 % — SIGNIFICANT CHANGE UP (ref 0–2)
EOSINOPHIL # BLD AUTO: 0.06 K/UL — SIGNIFICANT CHANGE UP (ref 0–0.5)
EOSINOPHIL NFR BLD AUTO: 0.6 % — SIGNIFICANT CHANGE UP (ref 0–6)
HCT VFR BLD CALC: 32.5 % — LOW (ref 34.5–45)
HGB BLD-MCNC: 10.3 G/DL — LOW (ref 11.5–15.5)
IANC: 7.73 K/UL — HIGH (ref 1.8–7.4)
IMM GRANULOCYTES NFR BLD AUTO: 0.6 % — SIGNIFICANT CHANGE UP (ref 0–0.9)
LYMPHOCYTES # BLD AUTO: 2.26 K/UL — SIGNIFICANT CHANGE UP (ref 1–3.3)
LYMPHOCYTES # BLD AUTO: 20.8 % — SIGNIFICANT CHANGE UP (ref 13–44)
MCHC RBC-ENTMCNC: 24.7 PG — LOW (ref 27–34)
MCHC RBC-ENTMCNC: 31.7 GM/DL — LOW (ref 32–36)
MCV RBC AUTO: 77.9 FL — LOW (ref 80–100)
MONOCYTES # BLD AUTO: 0.72 K/UL — SIGNIFICANT CHANGE UP (ref 0–0.9)
MONOCYTES NFR BLD AUTO: 6.6 % — SIGNIFICANT CHANGE UP (ref 2–14)
NEUTROPHILS # BLD AUTO: 7.73 K/UL — HIGH (ref 1.8–7.4)
NEUTROPHILS NFR BLD AUTO: 71.1 % — SIGNIFICANT CHANGE UP (ref 43–77)
NRBC # BLD: 0 /100 WBCS — SIGNIFICANT CHANGE UP (ref 0–0)
NRBC # FLD: 0 K/UL — SIGNIFICANT CHANGE UP (ref 0–0)
PLATELET # BLD AUTO: 213 K/UL — SIGNIFICANT CHANGE UP (ref 150–400)
RBC # BLD: 4.17 M/UL — SIGNIFICANT CHANGE UP (ref 3.8–5.2)
RBC # FLD: 16.3 % — HIGH (ref 10.3–14.5)
WBC # BLD: 10.87 K/UL — HIGH (ref 3.8–10.5)
WBC # FLD AUTO: 10.87 K/UL — HIGH (ref 3.8–10.5)

## 2024-08-26 PROCEDURE — 76815 OB US LIMITED FETUS(S): CPT | Mod: 26

## 2024-08-26 PROCEDURE — 0502F SUBSEQUENT PRENATAL CARE: CPT

## 2024-08-26 RX ORDER — VITAMIN A, ASCORBIC ACID, VITAMIN D, .ALPHA.-TOCOPHEROL, THIAMINE MONONITRATE, RIBOFLAVIN, NIACIN, PYRIDOXINE HYDROCHLORIDE, FOLIC ACID, CYANOCOBALAMIN, CALCIUM, IRON, MAGNESIUM, ZINC, AND COPPER 2700; 70; 400; 30; 1.6; 1.8; 18; 2.5; 1; 12; 100; 65; 25; 25; 2 [IU]/1; MG/1; [IU]/1; [IU]/1; MG/1; MG/1; MG/1; MG/1; MG/1; UG/1; MG/1; MG/1; MG/1; MG/1; MG/1
1 TABLET ORAL
Refills: 0 | DISCHARGE

## 2024-08-26 RX ORDER — CHLORHEXIDINE GLUCONATE 40 MG/ML
1 SOLUTION TOPICAL DAILY
Refills: 0 | Status: DISCONTINUED | OUTPATIENT
Start: 2024-08-26 | End: 2024-08-27

## 2024-08-26 RX ORDER — OXYTOCIN 10 UNIT/ML
333.33 AMPUL (ML) INJECTION
Qty: 20 | Refills: 0 | Status: COMPLETED | OUTPATIENT
Start: 2024-08-26 | End: 2024-08-27

## 2024-08-26 NOTE — OB PROVIDER TRIAGE NOTE - NSHPPHYSICALEXAM_GEN_ALL_CORE
Vital Signs Last 24 Hrs  T(C): 36.7 (26 Aug 2024 22:33), Max: 36.7 (26 Aug 2024 22:33)  T(F): 98.1 (26 Aug 2024 22:33), Max: 98.1 (26 Aug 2024 22:33)  HR: 85 (26 Aug 2024 23:00) (85 - 90)  BP: 104/61 (26 Aug 2024 23:00) (101/51 - 104/61)  RR: 16 (26 Aug 2024 22:33) (16 - 16)    Assessment reveals VSS  General: Female sitting comfortably in no apparent distress  Neuro: No facial asymmetry, no slurred speech, moves all 4 extremities  Mood: Alert and lucid, appropriate mood and affect  A&Ox3  Lungs- clear bilateral  Heart- normal rate and regular rhythm  Extremities- Warm, Dry, no edema present, good pulses   Abdomen soft, NT, gravid  Cat 1 tracing reactive, ctx every 2 mins  Transabdominal Ultrasound- images saved ASOB, vtx  Vaginal Exam- 4.5/80/-3        PLAN:  Admit for Labor

## 2024-08-26 NOTE — OB RN TRIAGE NOTE - FALL HARM RISK - RISK INTERVENTIONS

## 2024-08-26 NOTE — OB PROVIDER H&P - NSHPPHYSICALEXAM_GEN_ALL_CORE
Vital Signs Last 24 Hrs  T(C): 36.7 (26 Aug 2024 22:33), Max: 36.7 (26 Aug 2024 22:33)  T(F): 98.1 (26 Aug 2024 22:33), Max: 98.1 (26 Aug 2024 22:33)  HR: 85 (26 Aug 2024 23:00) (85 - 90)  BP: 104/61 (26 Aug 2024 23:00) (101/51 - 104/61)  RR: 16 (26 Aug 2024 22:33) (16 - 16)    Assessment reveals VSS  General: Female sitting comfortably in no apparent distress  Neuro: No facial asymmetry, no slurred speech, moves all 4 extremities  Mood: Alert and lucid, appropriate mood and affect  A&Ox3  Lungs- clear bilateral  Heart- normal rate and regular rhythm  Extremities- Warm, Dry, no edema present, good pulses   Abdomen soft, NT, gravid  Cat 1 tracing reactive, ctx every 2 mins  Transabdominal Ultrasound- images saved ASOB, vtx  Vaginal Exam- 4.5/80/-3        PLAN:  Admit for Labor Vital Signs Last 24 Hrs  T(C): 36.7 (26 Aug 2024 22:33), Max: 36.7 (26 Aug 2024 22:33)  T(F): 98.1 (26 Aug 2024 22:33), Max: 98.1 (26 Aug 2024 22:33)  HR: 85 (26 Aug 2024 23:00) (85 - 90)  BP: 104/61 (26 Aug 2024 23:00) (101/51 - 104/61)  RR: 16 (26 Aug 2024 22:33) (16 - 16)    Assessment reveals VSS  General: Female sitting comfortably in no apparent distress  Neuro: No facial asymmetry, no slurred speech, moves all 4 extremities  Mood: Alert and lucid, appropriate mood and affect  A&Ox3  Lungs- clear bilateral  Heart- normal rate and regular rhythm  Extremities- Warm, Dry, no edema present, good pulses   Abdomen soft, NT, gravid  Cat 1 tracing reactive, ctx every 2 mins  Transabdominal Ultrasound- images saved ASOB, vtx, ant placenta, mvp3.17  Vaginal Exam- 4.5/80/-3        PLAN:  Admit for Labor

## 2024-08-26 NOTE — OB PROVIDER TRIAGE NOTE - HISTORY OF PRESENT ILLNESS
29y/o  @39.1wks presents with painful contractions, pain scale 8/10  Reports good fetal movement  Denies LOF/VB    Allergies: Peaches-Hives  Medications: PNV  NPO 1400

## 2024-08-26 NOTE — OB PROVIDER H&P - NSLOWPPHRISK_OBGYN_A_OB
No previous uterine incision/Schultz Pregnancy/Less than or equal to 4 previous vaginal births/No known bleeding disorder/No history of postpartum hemorrhage/No other PPH risks indicated

## 2024-08-26 NOTE — OB RN PATIENT PROFILE - FUNCTIONAL ASSESSMENT - BASIC MOBILITY ASSESSMENT TYPE
Attempts made to swab pt's mouth with water d/t dry mucous membranes, but patient thrashes at it and spits it out Admission

## 2024-08-26 NOTE — OB RN PATIENT PROFILE - FALL HARM RISK - RISK INTERVENTIONS

## 2024-08-26 NOTE — OB RN TRIAGE NOTE - NS_BABIESUTERO_OBGYN_ALL_OB_NU
Call regarding: Patient called requesting a call back regarding questions she has on Diovan medication and insulin pen needles    Okay to leave detailed voice mail?  Yes    Pharmacy information verified:  No      1

## 2024-08-26 NOTE — OB PROVIDER H&P - PROBLEM SELECTOR PLAN 1
Admit for Labor  D/W Dr. Beltran  Routine Orders  Epidural for pain management  Expectant Management   GBS Negative 24    Risks, benefits, alternatives, and possible complications have been discussed in detail with the patient in her native language. Pre-admission, admission, and post admission procedures and expectations were discussed in detail. All questions answered, all appropriate hospital consents were signed. Anticipate normal vaginal delivery.   Informed consent was obtained. The following was discussed:   - Induction/augmentation of labor: use of medication and/or cook balloon to begin or enhance labor   - Obstetrical management including internal fetal/contraction monitoring   - Normal vaginal delivery   - Possible  section

## 2024-08-26 NOTE — OB RN TRIAGE NOTE - INTERNATIONAL TRAVEL
Problem: ABCDS Injury Assessment  Goal: Absence of physical injury  Outcome: Completed     Problem: Safety - Adult  Goal: Free from fall injury  Outcome: Completed     Problem: Pain  Goal: Verbalizes/displays adequate comfort level or baseline comfort level  Outcome: Completed     Problem: Skin/Tissue Integrity  Goal: Absence of new skin breakdown  Description: 1.  Monitor for areas of redness and/or skin breakdown  2.  Assess vascular access sites hourly  3.  Every 4-6 hours minimum:  Change oxygen saturation probe site  4.  Every 4-6 hours:  If on nasal continuous positive airway pressure, respiratory therapy assess nares and determine need for appliance change or resting period.  Outcome: Completed      No

## 2024-08-27 ENCOUNTER — TRANSCRIPTION ENCOUNTER (OUTPATIENT)
Age: 29
End: 2024-08-27

## 2024-08-27 PROCEDURE — 59400 OBSTETRICAL CARE: CPT | Mod: U9

## 2024-08-27 RX ORDER — ACETAMINOPHEN 325 MG/1
975 TABLET ORAL
Refills: 0 | Status: DISCONTINUED | OUTPATIENT
Start: 2024-08-27 | End: 2024-08-28

## 2024-08-27 RX ORDER — OXYTOCIN 10 UNIT/ML
2 AMPUL (ML) INJECTION
Qty: 30 | Refills: 0 | Status: DISCONTINUED | OUTPATIENT
Start: 2024-08-27 | End: 2024-08-27

## 2024-08-27 RX ORDER — HYDROCORTISONE 1 %
1 OINTMENT (GRAM) TOPICAL EVERY 6 HOURS
Refills: 0 | Status: DISCONTINUED | OUTPATIENT
Start: 2024-08-27 | End: 2024-08-28

## 2024-08-27 RX ORDER — IBUPROFEN 600 MG
600 TABLET ORAL EVERY 6 HOURS
Refills: 0 | Status: COMPLETED | OUTPATIENT
Start: 2024-08-27 | End: 2025-07-26

## 2024-08-27 RX ORDER — VITAMIN A, ASCORBIC ACID, VITAMIN D, .ALPHA.-TOCOPHEROL, THIAMINE MONONITRATE, RIBOFLAVIN, NIACIN, PYRIDOXINE HYDROCHLORIDE, FOLIC ACID, CYANOCOBALAMIN, CALCIUM, IRON, MAGNESIUM, ZINC, AND COPPER 2700; 70; 400; 30; 1.6; 1.8; 18; 2.5; 1; 12; 100; 65; 25; 25; 2 [IU]/1; MG/1; [IU]/1; [IU]/1; MG/1; MG/1; MG/1; MG/1; MG/1; UG/1; MG/1; MG/1; MG/1; MG/1; MG/1
1 TABLET ORAL DAILY
Refills: 0 | Status: DISCONTINUED | OUTPATIENT
Start: 2024-08-27 | End: 2024-08-28

## 2024-08-27 RX ORDER — WITCH HAZEL 1 G/ML
1 LIQUID TOPICAL EVERY 4 HOURS
Refills: 0 | Status: DISCONTINUED | OUTPATIENT
Start: 2024-08-27 | End: 2024-08-28

## 2024-08-27 RX ORDER — IBUPROFEN 600 MG
600 TABLET ORAL EVERY 6 HOURS
Refills: 0 | Status: DISCONTINUED | OUTPATIENT
Start: 2024-08-27 | End: 2024-08-28

## 2024-08-27 RX ORDER — SODIUM CHLORIDE 9 MG/ML
3 INJECTION INTRAMUSCULAR; INTRAVENOUS; SUBCUTANEOUS EVERY 8 HOURS
Refills: 0 | Status: DISCONTINUED | OUTPATIENT
Start: 2024-08-27 | End: 2024-08-28

## 2024-08-27 RX ORDER — DIPHENHYDRAMINE HCL 50 MG
25 CAPSULE ORAL EVERY 6 HOURS
Refills: 0 | Status: DISCONTINUED | OUTPATIENT
Start: 2024-08-27 | End: 2024-08-28

## 2024-08-27 RX ORDER — TETANUS TOXOID, REDUCED DIPHTHERIA TOXOID AND ACELLULAR PERTUSSIS VACCINE, ADSORBED 5; 2.5; 8; 8; 2.5 [IU]/.5ML; [IU]/.5ML; UG/.5ML; UG/.5ML; UG/.5ML
0.5 SUSPENSION INTRAMUSCULAR ONCE
Refills: 0 | Status: DISCONTINUED | OUTPATIENT
Start: 2024-08-27 | End: 2024-08-28

## 2024-08-27 RX ORDER — OXYCODONE HYDROCHLORIDE 5 MG/1
5 TABLET ORAL
Refills: 0 | Status: DISCONTINUED | OUTPATIENT
Start: 2024-08-27 | End: 2024-08-28

## 2024-08-27 RX ORDER — FAMOTIDINE 10 MG/ML
20 INJECTION INTRAVENOUS ONCE
Refills: 0 | Status: COMPLETED | OUTPATIENT
Start: 2024-08-27 | End: 2024-08-27

## 2024-08-27 RX ORDER — OXYTOCIN 10 UNIT/ML
41.67 AMPUL (ML) INJECTION
Qty: 20 | Refills: 0 | Status: DISCONTINUED | OUTPATIENT
Start: 2024-08-27 | End: 2024-08-27

## 2024-08-27 RX ORDER — MENTHOL/CETYLPYRD CL 3 MG
1 LOZENGE MUCOUS MEMBRANE EVERY 6 HOURS
Refills: 0 | Status: DISCONTINUED | OUTPATIENT
Start: 2024-08-27 | End: 2024-08-28

## 2024-08-27 RX ORDER — OXYCODONE HYDROCHLORIDE 5 MG/1
5 TABLET ORAL ONCE
Refills: 0 | Status: DISCONTINUED | OUTPATIENT
Start: 2024-08-27 | End: 2024-08-28

## 2024-08-27 RX ORDER — LANOLIN
1 OINTMENT (GRAM) TOPICAL EVERY 6 HOURS
Refills: 0 | Status: DISCONTINUED | OUTPATIENT
Start: 2024-08-27 | End: 2024-08-28

## 2024-08-27 RX ORDER — PRAMOXINE HCL 1 %
1 GEL (GRAM) TOPICAL EVERY 4 HOURS
Refills: 0 | Status: DISCONTINUED | OUTPATIENT
Start: 2024-08-27 | End: 2024-08-28

## 2024-08-27 RX ORDER — KETOROLAC TROMETHAMINE 30 MG/ML
30 INJECTION, SOLUTION INTRAMUSCULAR ONCE
Refills: 0 | Status: DISCONTINUED | OUTPATIENT
Start: 2024-08-27 | End: 2024-08-27

## 2024-08-27 RX ADMIN — CHLORHEXIDINE GLUCONATE 1 APPLICATION(S): 40 SOLUTION TOPICAL at 00:24

## 2024-08-27 RX ADMIN — KETOROLAC TROMETHAMINE 30 MILLIGRAM(S): 30 INJECTION, SOLUTION INTRAMUSCULAR at 09:30

## 2024-08-27 RX ADMIN — VITAMIN A, ASCORBIC ACID, VITAMIN D, .ALPHA.-TOCOPHEROL, THIAMINE MONONITRATE, RIBOFLAVIN, NIACIN, PYRIDOXINE HYDROCHLORIDE, FOLIC ACID, CYANOCOBALAMIN, CALCIUM, IRON, MAGNESIUM, ZINC, AND COPPER 1 TABLET(S): 2700; 70; 400; 30; 1.6; 1.8; 18; 2.5; 1; 12; 100; 65; 25; 25; 2 TABLET ORAL at 12:12

## 2024-08-27 RX ADMIN — Medication 2 MILLIUNIT(S)/MIN: at 05:02

## 2024-08-27 RX ADMIN — Medication 125 MILLILITER(S): at 00:24

## 2024-08-27 RX ADMIN — Medication 600 MILLIGRAM(S): at 18:35

## 2024-08-27 RX ADMIN — SODIUM CHLORIDE 3 MILLILITER(S): 9 INJECTION INTRAMUSCULAR; INTRAVENOUS; SUBCUTANEOUS at 22:24

## 2024-08-27 RX ADMIN — Medication 600 MILLIGRAM(S): at 18:05

## 2024-08-27 RX ADMIN — Medication 1000 MILLIUNIT(S)/MIN: at 07:46

## 2024-08-27 RX ADMIN — KETOROLAC TROMETHAMINE 30 MILLIGRAM(S): 30 INJECTION, SOLUTION INTRAMUSCULAR at 09:02

## 2024-08-27 RX ADMIN — SODIUM CHLORIDE 3 MILLILITER(S): 9 INJECTION INTRAMUSCULAR; INTRAVENOUS; SUBCUTANEOUS at 14:00

## 2024-08-27 RX ADMIN — FAMOTIDINE 20 MILLIGRAM(S): 10 INJECTION INTRAVENOUS at 05:22

## 2024-08-27 NOTE — OB RN DELIVERY SUMMARY - NSSELHIDDEN_OBGYN_ALL_OB_FT
[NS_DeliveryAttending1_OBGYN_ALL_OB_FT:[id number]==],[NS_DeliveryRN_OBGYN_ALL_OB_FT:[id number]==]

## 2024-08-27 NOTE — DISCHARGE NOTE OB - PATIENT PORTAL LINK FT
You can access the FollowMyHealth Patient Portal offered by [facility] by registering at the following website: [email or web address]. By joining [facility]’s FollowMyHealth portal, you will also be able to view your health information using other applications (apps) compatible with our system.

## 2024-08-27 NOTE — CHART NOTE - NSCHARTNOTEFT_GEN_A_CORE
OB Attending Labor Note    Pt seen and examined, assessed for cervical change    T(C): 36.9 (08-26-24 @ 23:50), Max: 36.9 (08-26-24 @ 23:48)  HR: 87 (08-27-24 @ 02:05) (67 - 125)  BP: 90/54 (08-27-24 @ 01:49) (90/54 - 109/70)  RR: 16 (08-26-24 @ 23:50) (16 - 16)  SpO2: 90% (08-27-24 @ 02:06) (81% - 100%)    FHT: Cat I tracing  Bruni: irreg  VE: 5.5/50/-3    pt declined pitocin  cont toco/EFM  will reassess in 2 hrs    Diogo Dowd MD

## 2024-08-27 NOTE — CHART NOTE - NSCHARTNOTEFT_GEN_A_CORE
OB Attending Labor Note    Pt seen and examined for AROM (clear fluid)    T(C): 36.8 (08-27-24 @ 06:04), Max: 37.1 (08-27-24 @ 01:58)  HR: 92 (08-27-24 @ 06:36) (67 - 125)  BP: 93/59 (08-27-24 @ 06:34) (89/52 - 109/70)  RR: 16 (08-27-24 @ 06:04) (16 - 17)  SpO2: 99% (08-27-24 @ 06:36) (81% - 100%)    FHT: Cat I tracing  Emily: q2-4 mins  VE: 7/50/-3    cont toco/EFM  cont Pitocin    Diogo Dowd MD

## 2024-08-27 NOTE — DISCHARGE NOTE OB - IF BREASTFEEDING, HAVE A GLASS OF WATER, JUICE, OR LOW-FAT MILK EACH TIME YOU FEED YOUR BABY
CERTIFICATE OF WORK    March 21, 2019      Re: Ranjan Meza  [address]  [facility] [address]      This is to certify that Ranjan Meza has been under my care from 1/15/19.  Ranjan is able to return to work on 3/5/19 with the following restrictions:    RESTRICTIONS: Avoidance of prolonged standing. Ranjan is to sit with legs elevated 20 minutes out of every hour while at work. Ranjan is also to wear compression stockings/hosiery continuously.        REMARKS: Patient has leg swelling which is exacerbated by prolonged standing.     These restrictions are in effect until reevaluation on 4/15/19.         SIGNATURE:___________________________________________,   3/21/2019  MD Steve Odonnell MD  Dermatology  [address] [place]  [address] [phone]            
Statement Selected

## 2024-08-27 NOTE — DISCHARGE NOTE OB - MEDICATION SUMMARY - MEDICATIONS TO TAKE
I will START or STAY ON the medications listed below when I get home from the hospital:    ibuprofen 600 mg oral tablet  -- 1 tab(s) by mouth every 6 hours  -- Indication: For Pain    acetaminophen 325 mg oral tablet  -- 3 tab(s) by mouth every 6 hours  -- Indication: For Pain    Prenatal 1 oral capsule  -- 1 tab(s) by mouth once a day  -- Indication: For home med

## 2024-08-27 NOTE — OB RN DELIVERY SUMMARY - BABY A: ID BAND NUMBER, DELIVERY
[id number] hx as per PA and pt, 35yo female with back pain s/p lifting up baby  exam shows lumbar spasm, neuro intact  plan: pain meds, d/c home  agree with assessment and plan of PA

## 2024-08-27 NOTE — DISCHARGE NOTE OB - NS MD DC FALL RISK RISK
For information on Fall & Injury Prevention, visit: [email or web address] OR  [email or web address] OR  https://www.cdc.gov/steadi/patient.html

## 2024-08-27 NOTE — OB PROVIDER LABOR PROGRESS NOTE - NS_SUBJECTIVE/OBJECTIVE_OBGYN_ALL_OB_FT
R1 Labor & Delivery Progress Note     Pt seen & examined at bedside for repeat cervical exam    T(C): 37.0 (08-27-24 @ 04:00), Max: 37.1 (08-27-24 @ 01:58)  HR: 88 (08-27-24 @ 05:11) (67 - 125)  BP: 96/56 (08-27-24 @ 05:04) (90/54 - 109/70)  RR: 17 (08-27-24 @ 04:00) (16 - 17)  SpO2: 89% (08-27-24 @ 05:13) (81% - 100%)

## 2024-08-27 NOTE — OB PROVIDER LABOR PROGRESS NOTE - ASSESSMENT
A/P    Patient is in stable condition.     - Cont IOL with pitocin  - Cont EFM, [place]  - Cont IV    D/w Dr. Noemí Palomo, PGY1

## 2024-08-27 NOTE — DISCHARGE NOTE OB - CARE PROVIDER_API CALL
Joseph Moraes  Maternal/Fetal Medicine  [address], [address]  [place] [address]  Phone: [phone]  Fax: [phone]  Follow Up Time:

## 2024-08-27 NOTE — OB RN DELIVERY SUMMARY - NSSTERILIZETYPE_OBGYN_ALL_OB
normal... appears comfortable, awake, alert, oriented to person, place, time/situation and in no apparent distress. None

## 2024-08-27 NOTE — OB RN DELIVERY SUMMARY - NS_SEPSISRSKCALC_OBGYN_ALL_OB_FT
EOS calculated successfully. EOS Risk Factor: 0.5/1000 live births ([facility] national incidence); GA=39w2d; Temp=98.78; ROM=1.183; GBS='Negative'; Antibiotics='No antibiotics or any antibiotics < 2 hrs prior to birth'

## 2024-08-28 VITALS
DIASTOLIC BLOOD PRESSURE: 60 MMHG | SYSTOLIC BLOOD PRESSURE: 105 MMHG | TEMPERATURE: 98 F | HEART RATE: 68 BPM | RESPIRATION RATE: 18 BRPM | OXYGEN SATURATION: 99 %

## 2024-08-28 RX ORDER — IBUPROFEN 600 MG
1 TABLET ORAL
Qty: 0 | Refills: 0 | DISCHARGE
Start: 2024-08-28

## 2024-08-28 RX ORDER — ACETAMINOPHEN 325 MG/1
3 TABLET ORAL
Qty: 0 | Refills: 0 | DISCHARGE
Start: 2024-08-28

## 2024-08-28 RX ADMIN — ACETAMINOPHEN 975 MILLIGRAM(S): 325 TABLET ORAL at 09:04

## 2024-08-28 RX ADMIN — ACETAMINOPHEN 975 MILLIGRAM(S): 325 TABLET ORAL at 02:13

## 2024-08-28 RX ADMIN — ACETAMINOPHEN 975 MILLIGRAM(S): 325 TABLET ORAL at 09:52

## 2024-08-28 RX ADMIN — Medication 600 MILLIGRAM(S): at 13:37

## 2024-08-28 RX ADMIN — ACETAMINOPHEN 975 MILLIGRAM(S): 325 TABLET ORAL at 02:43

## 2024-08-28 RX ADMIN — Medication 600 MILLIGRAM(S): at 12:48

## 2024-08-28 RX ADMIN — VITAMIN A, ASCORBIC ACID, VITAMIN D, .ALPHA.-TOCOPHEROL, THIAMINE MONONITRATE, RIBOFLAVIN, NIACIN, PYRIDOXINE HYDROCHLORIDE, FOLIC ACID, CYANOCOBALAMIN, CALCIUM, IRON, MAGNESIUM, ZINC, AND COPPER 1 TABLET(S): 2700; 70; 400; 30; 1.6; 1.8; 18; 2.5; 1; 12; 100; 65; 25; 25; 2 TABLET ORAL at 12:48

## 2024-08-28 NOTE — PROGRESS NOTE ADULT - ASSESSMENT
A/P: 27yo PPD#1 s/p .  Patient is stable and doing well post-partum.   - Pain well controlled, continue current pain regimen  - Increase ambulation, SCDs when not ambulating  - Continue regular diet  - discharge planning     Asya MONROY

## 2024-08-28 NOTE — PROGRESS NOTE ADULT - SUBJECTIVE AND OBJECTIVE BOX
OB Progress Note:  PPD#1    S: 29yo  PPD#1 s/p . Patient feels well. Pain is well controlled, tolerating regular diet, passing flatus, voiding spontaneously, ambulating without difficulty. Denies heavy vaginal bleeding, CP/SOB, N/V, lightheadedness/dizziness.     O:  Vitals:  Vital Signs Last 24 Hrs  T(C): 36.7 (28 Aug 2024 06:50), Max: 37.3 (27 Aug 2024 18:10)  T(F): 98 (28 Aug 2024 06:50), Max: 99.1 (27 Aug 2024 18:10)  HR: 83 (28 Aug 2024 06:50) (65 - 90)  BP: 98/57 (28 Aug 2024 06:50) (88/51 - 98/57)  BP(mean): --  RR: 16 (28 Aug 2024 06:50) (16 - 18)  SpO2: 100% (28 Aug 2024 06:50) (92% - 100%)    Parameters below as of 27 Aug 2024 11:30  Patient On (Oxygen Delivery Method): room air        MEDICATIONS  (STANDING):  acetaminophen     Tablet .. 975 milliGRAM(s) Oral <User Schedule>  diphtheria/tetanus/pertussis (acellular) Vaccine (Adacel) 0.5 milliLiter(s) IntraMuscular once  ibuprofen  Tablet. 600 milliGRAM(s) Oral every 6 hours  prenatal multivitamin 1 Tablet(s) Oral daily  sodium chloride 0.9% lock flush 3 milliLiter(s) IV Push every 8 hours      Labs:  Blood type: B Positive  Rubella IgG: RPR: Negative                          10.3<L>   10.87<H> >-----------< 213    (  @ 23:30 )             32.5<L>                  Physical Exam:  General: NAD  CV: RRR  Resp: CTAB  Abdomen: soft, non-tender, non-distended, fundus firm  : Nl lochia  Extremities: No erythema/edema

## 2024-09-03 ENCOUNTER — APPOINTMENT (OUTPATIENT)
Dept: OBGYN | Facility: CLINIC | Age: 29
End: 2024-09-03

## 2024-09-03 ENCOUNTER — APPOINTMENT (OUTPATIENT)
Dept: ANTEPARTUM | Facility: CLINIC | Age: 29
End: 2024-09-03

## 2024-10-22 ENCOUNTER — APPOINTMENT (OUTPATIENT)
Dept: OBGYN | Facility: CLINIC | Age: 29
End: 2024-10-22
Payer: MEDICAID

## 2024-10-22 VITALS — BODY MASS INDEX: 26.63 KG/M2 | DIASTOLIC BLOOD PRESSURE: 67 MMHG | WEIGHT: 170 LBS | SYSTOLIC BLOOD PRESSURE: 106 MMHG

## 2024-10-22 PROCEDURE — 0503F POSTPARTUM CARE VISIT: CPT

## 2024-10-30 ENCOUNTER — APPOINTMENT (OUTPATIENT)
Dept: ANTEPARTUM | Facility: CLINIC | Age: 29
End: 2024-10-30

## 2024-11-05 ENCOUNTER — APPOINTMENT (OUTPATIENT)
Dept: OBGYN | Facility: CLINIC | Age: 29
End: 2024-11-05
Payer: MEDICAID

## 2024-11-05 VITALS — SYSTOLIC BLOOD PRESSURE: 101 MMHG | BODY MASS INDEX: 26.16 KG/M2 | DIASTOLIC BLOOD PRESSURE: 69 MMHG | WEIGHT: 167 LBS

## 2024-11-05 DIAGNOSIS — Z30.430 ENCOUNTER FOR INSERTION OF INTRAUTERINE CONTRACEPTIVE DEVICE: ICD-10-CM

## 2024-11-05 PROCEDURE — 58300 INSERT INTRAUTERINE DEVICE: CPT

## 2024-11-05 RX ORDER — LEVONORGESTREL 52 MG/1
20 INTRAUTERINE DEVICE INTRAUTERINE
Qty: 0 | Refills: 0 | Status: COMPLETED | OUTPATIENT
Start: 2024-11-05

## 2024-11-05 RX ADMIN — LEVONORGESTREL MCG/DAY: 52 INTRAUTERINE DEVICE INTRAUTERINE at 00:00

## 2024-12-03 ENCOUNTER — APPOINTMENT (OUTPATIENT)
Dept: OBGYN | Facility: CLINIC | Age: 29
End: 2024-12-03

## 2024-12-03 ENCOUNTER — APPOINTMENT (OUTPATIENT)
Dept: ANTEPARTUM | Facility: CLINIC | Age: 29
End: 2024-12-03

## 2025-02-03 NOTE — ED PROVIDER NOTE - CPE EDP ENMT NORM
Received request via: Pharmacy    Was the patient seen in the last year in this department? Yes    Does the patient have an active prescription (recently filled or refills available) for medication(s) requested? No    Pharmacy Name: [id number] - [place], [place] - [address]     Does the patient have [facility] Plus and need 100-day supply? (This applies to ALL medications) Patient does not have SCP   Requested Prescriptions     Pending Prescriptions Disp Refills    phentermine 37.5 MG capsule [Pharmacy Med Name: Phentermine HCl 37.5 MG Oral Capsule] 90 Capsule 0     Sig: TAKE 1 CAPSULE BY MOUTH IN THE MORNING. **NEEDS TO BE SEEN FOR ANY MORE REFILLS AFTER THIS ONE; MAKE 3 MONTH APPOINTMENT NOW**       normal...

## 2025-03-06 NOTE — OB RN PATIENT PROFILE - NS_CONTACTNUMBEROFSUPPORTPERSON_OBGYN_ALL_OB_FT
1256 Rec'd pt to PACU asleep with no distress noted, respirations even and unlabored. VSS. Left knee dressing C/D/I. No needs. Will continue to monitor.     1405 Out of PACU. VSS. No signs of bleeding/distress noted.     1410 Pt to ASC 22 drowsy but arousable to verbal stimuli. No signs of distress noted, respirations even and unlabored. Family at bedside. Bedside report given to TRICE Sheldon RN. Left knee dressing C/D/I. C/o pain, denies other needs. /43, P 75, R 16, O2 96% RA.   
[phone]

## 2025-03-13 NOTE — ED ADULT NURSE NOTE - PRIMARY CARE PROVIDER
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 Described as oblique and overlapping, binocular. Constant since onset. No eye pain, headache. MRI brain wwo demonstrated a nonenhancing lesion in the L periventricular frontal lobe, and an enhancing L medial midbrain lesion. Pt testing positive for Lyme IgG and IgM     Exam 3/13/25: no APD, VA 20/25-2 OD 20/20+2 PH OS, HVF normal OD occasional superior misses w/ low test reliability OS. Full EOM. Incomitant RHT worse on downgaze, R head tilt. Optic nerves pink with sharp disc margins. RNFL 93 OD 92 OS     Plan:   Exam demonstrates an incomitant RHT that does not fit a cranial nerve palsy. Given the enhancing midbrain lesion, I question whether patient has a skew deviation. No findings of a 3rd nerve palsy on exam today. Given positive lyme antibodies, will recommended LP with CSF studies to further differentiate between demyelinating disease vs infectious. MRI reviewed and lesions appear typical for MS. Will send to neuro immunology for further evaluation. Hold on steroids until active lyme disease ruled out     -LP with opening pressures  -CSF studies: CSF cell/diff, cultures, OCB, lyme, IgG index, cytology, flow cytometry, protein, glucose   -Referral to Neuro immunology   -return for fresnel trial 1 BD OD ; pt forgot glasses today   -RTC in 4 weeks   -Labs CALEB, hepatitis panel, CNS demyelinating, ESR/CRP, RPR        Dr. Moraes

## 2025-04-24 RX ORDER — DOCONEXENT, NIACINAMIDE, .ALPHA.-TOCOPHEROL ACETATE, DL-, CHOLECALCIFEROL, BETA CAROTENE, ASCORBIC ACID, THIAMINE MONONITRATE, RIBOFLAVIN, PYRIDOXINE HYDROCHLORIDE, CYANOCOBALAMIN, IRON, ZINC OXIDE, CUPRIC OXIDE, POTASSIUM IODIDE, MAGNESIUM OXIDE, FOLIC ACID, AND LEVOMEFOLATE CALCIUM 200; 15; 20; 1000; 1100; 60; 1.6; 1.8; 2.5; 25; 90; 25; 2; 150; 20; 1; .6 MG/1; MG/1; [IU]/1; [IU]/1; [IU]/1; MG/1; MG/1; MG/1; MG/1; UG/1; MG/1; MG/1; MG/1; UG/1; MG/1; MG/1; MG/1
90-0.6-0.4-2 CAPSULE, LIQUID FILLED ORAL DAILY
Qty: 1 | Refills: 3 | Status: ACTIVE | COMMUNITY
Start: 2025-04-24 | End: 1900-01-01

## 2025-04-29 ENCOUNTER — APPOINTMENT (OUTPATIENT)
Dept: ANTEPARTUM | Facility: CLINIC | Age: 30
End: 2025-04-29
Payer: MEDICAID

## 2025-04-29 ENCOUNTER — APPOINTMENT (OUTPATIENT)
Dept: OBGYN | Facility: CLINIC | Age: 30
End: 2025-04-29
Payer: MEDICAID

## 2025-04-29 ENCOUNTER — ASOB RESULT (OUTPATIENT)
Age: 30
End: 2025-04-29

## 2025-04-29 VITALS
HEIGHT: 67 IN | SYSTOLIC BLOOD PRESSURE: 104 MMHG | BODY MASS INDEX: 27.62 KG/M2 | DIASTOLIC BLOOD PRESSURE: 69 MMHG | WEIGHT: 176 LBS

## 2025-04-29 DIAGNOSIS — Z3A.16 16 WEEKS GESTATION OF PREGNANCY: ICD-10-CM

## 2025-04-29 DIAGNOSIS — Z33.1 PREGNANT STATE, INCIDENTAL: ICD-10-CM

## 2025-04-29 DIAGNOSIS — Z01.419 ENCOUNTER FOR GYNECOLOGICAL EXAMINATION (GENERAL) (ROUTINE) W/OUT ABNORMAL FINDINGS: ICD-10-CM

## 2025-04-29 DIAGNOSIS — Z31.69 ENCOUNTER FOR OTHER GENERAL COUNSELING AND ADVICE ON PROCREATION: ICD-10-CM

## 2025-04-29 DIAGNOSIS — Z3A.24 24 WEEKS GESTATION OF PREGNANCY: ICD-10-CM

## 2025-04-29 DIAGNOSIS — Z3A.12 12 WEEKS GESTATION OF PREGNANCY: ICD-10-CM

## 2025-04-29 DIAGNOSIS — Z3A.17 17 WEEKS GESTATION OF PREGNANCY: ICD-10-CM

## 2025-04-29 DIAGNOSIS — O99.019 ANEMIA COMPLICATING PREGNANCY, UNSPECIFIED TRIMESTER: ICD-10-CM

## 2025-04-29 PROCEDURE — 99459 PELVIC EXAMINATION: CPT

## 2025-04-29 PROCEDURE — 99395 PREV VISIT EST AGE 18-39: CPT

## 2025-04-29 PROCEDURE — 76857 US EXAM PELVIC LIMITED: CPT

## 2025-04-29 PROCEDURE — 76830 TRANSVAGINAL US NON-OB: CPT

## 2025-05-04 LAB — CYTOLOGY CVX/VAG DOC THIN PREP: NORMAL

## 2025-06-24 ENCOUNTER — APPOINTMENT (OUTPATIENT)
Dept: ANTEPARTUM | Facility: CLINIC | Age: 30
End: 2025-06-24
Payer: MEDICAID

## 2025-06-24 ENCOUNTER — ASOB RESULT (OUTPATIENT)
Age: 30
End: 2025-06-24

## 2025-06-24 ENCOUNTER — APPOINTMENT (OUTPATIENT)
Dept: OBGYN | Facility: CLINIC | Age: 30
End: 2025-06-24
Payer: MEDICAID

## 2025-06-24 VITALS
SYSTOLIC BLOOD PRESSURE: 111 MMHG | DIASTOLIC BLOOD PRESSURE: 76 MMHG | BODY MASS INDEX: 26.68 KG/M2 | HEIGHT: 67 IN | WEIGHT: 170 LBS

## 2025-06-24 PROBLEM — N92.0 HEAVY MENSES: Status: ACTIVE | Noted: 2025-06-24

## 2025-06-24 PROCEDURE — 99213 OFFICE O/P EST LOW 20 MIN: CPT

## 2025-06-24 PROCEDURE — 76857 US EXAM PELVIC LIMITED: CPT

## 2025-06-24 PROCEDURE — 76830 TRANSVAGINAL US NON-OB: CPT

## 2025-09-16 ENCOUNTER — APPOINTMENT (OUTPATIENT)
Dept: ANTEPARTUM | Facility: CLINIC | Age: 30
End: 2025-09-16
Payer: MEDICAID

## 2025-09-16 ENCOUNTER — ASOB RESULT (OUTPATIENT)
Age: 30
End: 2025-09-16

## 2025-09-16 ENCOUNTER — APPOINTMENT (OUTPATIENT)
Dept: OBGYN | Facility: CLINIC | Age: 30
End: 2025-09-16
Payer: MEDICAID

## 2025-09-16 VITALS
HEIGHT: 67 IN | BODY MASS INDEX: 26.84 KG/M2 | DIASTOLIC BLOOD PRESSURE: 82 MMHG | SYSTOLIC BLOOD PRESSURE: 120 MMHG | WEIGHT: 171 LBS

## 2025-09-16 DIAGNOSIS — Z97.5 PRESENCE OF (INTRAUTERINE) CONTRACEPTIVE DEVICE: ICD-10-CM

## 2025-09-16 PROCEDURE — 99213 OFFICE O/P EST LOW 20 MIN: CPT

## 2025-09-16 PROCEDURE — 76830 TRANSVAGINAL US NON-OB: CPT

## 2025-09-16 PROCEDURE — 76857 US EXAM PELVIC LIMITED: CPT | Mod: 59
